# Patient Record
Sex: FEMALE | Race: WHITE | NOT HISPANIC OR LATINO | Employment: OTHER | URBAN - METROPOLITAN AREA
[De-identification: names, ages, dates, MRNs, and addresses within clinical notes are randomized per-mention and may not be internally consistent; named-entity substitution may affect disease eponyms.]

---

## 2017-01-04 ENCOUNTER — ALLSCRIPTS OFFICE VISIT (OUTPATIENT)
Dept: OTHER | Facility: OTHER | Age: 82
End: 2017-01-04

## 2017-01-04 DIAGNOSIS — G89.18 OTHER ACUTE POSTPROCEDURAL PAIN: ICD-10-CM

## 2017-01-04 DIAGNOSIS — R26.2 DIFFICULTY IN WALKING, NOT ELSEWHERE CLASSIFIED: ICD-10-CM

## 2017-01-04 DIAGNOSIS — R45.1 RESTLESSNESS AND AGITATION: ICD-10-CM

## 2017-01-04 DIAGNOSIS — E53.8 DEFICIENCY OF OTHER SPECIFIED B GROUP VITAMINS: ICD-10-CM

## 2017-01-04 DIAGNOSIS — R73.9 HYPERGLYCEMIA: ICD-10-CM

## 2017-01-04 DIAGNOSIS — E55.9 VITAMIN D DEFICIENCY: ICD-10-CM

## 2017-01-05 ENCOUNTER — TRANSCRIBE ORDERS (OUTPATIENT)
Dept: ADMINISTRATIVE | Age: 82
End: 2017-01-05

## 2017-01-05 ENCOUNTER — LAB (OUTPATIENT)
Dept: LAB | Age: 82
End: 2017-01-05
Payer: MEDICARE

## 2017-01-05 DIAGNOSIS — R45.1 RESTLESSNESS AND AGITATION: ICD-10-CM

## 2017-01-05 DIAGNOSIS — G89.18 OTHER ACUTE POSTPROCEDURAL PAIN: ICD-10-CM

## 2017-01-05 DIAGNOSIS — R26.2 DIFFICULTY IN WALKING, NOT ELSEWHERE CLASSIFIED: ICD-10-CM

## 2017-01-05 DIAGNOSIS — R73.9 HYPERGLYCEMIA: ICD-10-CM

## 2017-01-05 DIAGNOSIS — E53.8 DEFICIENCY OF OTHER SPECIFIED B GROUP VITAMINS: ICD-10-CM

## 2017-01-05 DIAGNOSIS — E55.9 VITAMIN D DEFICIENCY: ICD-10-CM

## 2017-01-05 LAB
25(OH)D3 SERPL-MCNC: 38.5 NG/ML (ref 30–100)
ALBUMIN SERPL BCP-MCNC: 3.6 G/DL (ref 3.5–5)
ALP SERPL-CCNC: 89 U/L (ref 46–116)
ALT SERPL W P-5'-P-CCNC: 22 U/L (ref 12–78)
ANION GAP SERPL CALCULATED.3IONS-SCNC: 5 MMOL/L (ref 4–13)
AST SERPL W P-5'-P-CCNC: 12 U/L (ref 5–45)
BASOPHILS # BLD AUTO: 0.02 THOUSANDS/ΜL (ref 0–0.1)
BASOPHILS NFR BLD AUTO: 0 % (ref 0–1)
BILIRUB SERPL-MCNC: 0.46 MG/DL (ref 0.2–1)
BUN SERPL-MCNC: 17 MG/DL (ref 5–25)
CALCIUM SERPL-MCNC: 9.6 MG/DL (ref 8.3–10.1)
CHLORIDE SERPL-SCNC: 105 MMOL/L (ref 100–108)
CHOLEST SERPL-MCNC: 238 MG/DL (ref 50–200)
CO2 SERPL-SCNC: 32 MMOL/L (ref 21–32)
CREAT SERPL-MCNC: 0.77 MG/DL (ref 0.6–1.3)
EOSINOPHIL # BLD AUTO: 0.19 THOUSAND/ΜL (ref 0–0.61)
EOSINOPHIL NFR BLD AUTO: 3 % (ref 0–6)
ERYTHROCYTE [DISTWIDTH] IN BLOOD BY AUTOMATED COUNT: 13.7 % (ref 11.6–15.1)
EST. AVERAGE GLUCOSE BLD GHB EST-MCNC: 117 MG/DL
GFR SERPL CREATININE-BSD FRML MDRD: >60 ML/MIN/1.73SQ M
GLUCOSE SERPL-MCNC: 83 MG/DL (ref 65–140)
HBA1C MFR BLD: 5.7 % (ref 4.2–6.3)
HCT VFR BLD AUTO: 43.2 % (ref 34.8–46.1)
HDLC SERPL-MCNC: 60 MG/DL (ref 40–60)
HGB BLD-MCNC: 14.1 G/DL (ref 11.5–15.4)
LDLC SERPL CALC-MCNC: 153 MG/DL (ref 0–100)
LYMPHOCYTES # BLD AUTO: 1.69 THOUSANDS/ΜL (ref 0.6–4.47)
LYMPHOCYTES NFR BLD AUTO: 24 % (ref 14–44)
MCH RBC QN AUTO: 28.4 PG (ref 26.8–34.3)
MCHC RBC AUTO-ENTMCNC: 32.6 G/DL (ref 31.4–37.4)
MCV RBC AUTO: 87 FL (ref 82–98)
MONOCYTES # BLD AUTO: 0.76 THOUSAND/ΜL (ref 0.17–1.22)
MONOCYTES NFR BLD AUTO: 11 % (ref 4–12)
NEUTROPHILS # BLD AUTO: 4.45 THOUSANDS/ΜL (ref 1.85–7.62)
NEUTS SEG NFR BLD AUTO: 62 % (ref 43–75)
NRBC BLD AUTO-RTO: 0 /100 WBCS
PLATELET # BLD AUTO: 217 THOUSANDS/UL (ref 149–390)
PMV BLD AUTO: 11.3 FL (ref 8.9–12.7)
POTASSIUM SERPL-SCNC: 4.6 MMOL/L (ref 3.5–5.3)
PROT SERPL-MCNC: 7.4 G/DL (ref 6.4–8.2)
RBC # BLD AUTO: 4.97 MILLION/UL (ref 3.81–5.12)
SODIUM SERPL-SCNC: 142 MMOL/L (ref 136–145)
TRIGL SERPL-MCNC: 126 MG/DL
TSH SERPL DL<=0.05 MIU/L-ACNC: 1.85 UIU/ML (ref 0.36–3.74)
VIT B12 SERPL-MCNC: 585 PG/ML (ref 100–900)
WBC # BLD AUTO: 7.14 THOUSAND/UL (ref 4.31–10.16)

## 2017-01-05 PROCEDURE — 85025 COMPLETE CBC W/AUTO DIFF WBC: CPT

## 2017-01-05 PROCEDURE — 36415 COLL VENOUS BLD VENIPUNCTURE: CPT

## 2017-01-05 PROCEDURE — 82607 VITAMIN B-12: CPT

## 2017-01-05 PROCEDURE — 83036 HEMOGLOBIN GLYCOSYLATED A1C: CPT

## 2017-01-05 PROCEDURE — 84443 ASSAY THYROID STIM HORMONE: CPT

## 2017-01-05 PROCEDURE — 80053 COMPREHEN METABOLIC PANEL: CPT

## 2017-01-05 PROCEDURE — 80061 LIPID PANEL: CPT

## 2017-01-05 PROCEDURE — 82306 VITAMIN D 25 HYDROXY: CPT

## 2017-01-30 ENCOUNTER — HOSPITAL ENCOUNTER (EMERGENCY)
Facility: HOSPITAL | Age: 82
Discharge: HOME | End: 2017-01-30
Attending: EMERGENCY MEDICINE | Admitting: EMERGENCY MEDICINE
Payer: MEDICARE

## 2017-01-30 ENCOUNTER — APPOINTMENT (EMERGENCY)
Dept: RADIOLOGY | Facility: HOSPITAL | Age: 82
End: 2017-01-30
Payer: MEDICARE

## 2017-01-30 VITALS
SYSTOLIC BLOOD PRESSURE: 161 MMHG | OXYGEN SATURATION: 94 % | TEMPERATURE: 97.8 F | WEIGHT: 110 LBS | HEART RATE: 75 BPM | DIASTOLIC BLOOD PRESSURE: 70 MMHG | RESPIRATION RATE: 18 BRPM

## 2017-01-30 DIAGNOSIS — R10.9 ABDOMINAL PAIN: ICD-10-CM

## 2017-01-30 DIAGNOSIS — K80.20 CHOLELITHIASES: Primary | ICD-10-CM

## 2017-01-30 LAB
ALBUMIN SERPL BCP-MCNC: 3.4 G/DL (ref 3.5–5)
ALP SERPL-CCNC: 106 U/L (ref 46–116)
ALT SERPL W P-5'-P-CCNC: 23 U/L (ref 12–78)
ANION GAP SERPL CALCULATED.3IONS-SCNC: 8 MMOL/L (ref 4–13)
AST SERPL W P-5'-P-CCNC: 18 U/L (ref 5–45)
BASOPHILS # BLD AUTO: 0.04 THOUSANDS/ΜL (ref 0–0.1)
BASOPHILS NFR BLD AUTO: 0 % (ref 0–1)
BILIRUB SERPL-MCNC: 0.33 MG/DL (ref 0.2–1)
BUN SERPL-MCNC: 17 MG/DL (ref 5–25)
CALCIUM SERPL-MCNC: 9.1 MG/DL (ref 8.3–10.1)
CHLORIDE SERPL-SCNC: 99 MMOL/L (ref 100–108)
CO2 SERPL-SCNC: 26 MMOL/L (ref 21–32)
CREAT SERPL-MCNC: 0.71 MG/DL (ref 0.6–1.3)
EOSINOPHIL # BLD AUTO: 0.13 THOUSAND/ΜL (ref 0–0.61)
EOSINOPHIL NFR BLD AUTO: 1 % (ref 0–6)
ERYTHROCYTE [DISTWIDTH] IN BLOOD BY AUTOMATED COUNT: 13.6 % (ref 11.6–15.1)
GFR SERPL CREATININE-BSD FRML MDRD: >60 ML/MIN/1.73SQ M
GLUCOSE SERPL-MCNC: 108 MG/DL (ref 65–140)
HCT VFR BLD AUTO: 42.2 % (ref 34.8–46.1)
HGB BLD-MCNC: 14.3 G/DL (ref 11.5–15.4)
LIPASE SERPL-CCNC: 209 U/L (ref 73–393)
LYMPHOCYTES # BLD AUTO: 1.94 THOUSANDS/ΜL (ref 0.6–4.47)
LYMPHOCYTES NFR BLD AUTO: 21 % (ref 14–44)
MCH RBC QN AUTO: 28.3 PG (ref 26.8–34.3)
MCHC RBC AUTO-ENTMCNC: 33.9 G/DL (ref 31.4–37.4)
MCV RBC AUTO: 83 FL (ref 82–98)
MONOCYTES # BLD AUTO: 1.16 THOUSAND/ΜL (ref 0.17–1.22)
MONOCYTES NFR BLD AUTO: 13 % (ref 4–12)
NEUTROPHILS # BLD AUTO: 5.91 THOUSANDS/ΜL (ref 1.85–7.62)
NEUTS SEG NFR BLD AUTO: 65 % (ref 43–75)
NRBC BLD AUTO-RTO: 0 /100 WBCS
PLATELET # BLD AUTO: 233 THOUSANDS/UL (ref 149–390)
PMV BLD AUTO: 10.7 FL (ref 8.9–12.7)
POTASSIUM SERPL-SCNC: 4.5 MMOL/L (ref 3.5–5.3)
PROT SERPL-MCNC: 7.4 G/DL (ref 6.4–8.2)
RBC # BLD AUTO: 5.06 MILLION/UL (ref 3.81–5.12)
SODIUM SERPL-SCNC: 133 MMOL/L (ref 136–145)
TROPONIN I SERPL-MCNC: <0.02 NG/ML
WBC # BLD AUTO: 9.22 THOUSAND/UL (ref 4.31–10.16)

## 2017-01-30 PROCEDURE — 36415 COLL VENOUS BLD VENIPUNCTURE: CPT

## 2017-01-30 PROCEDURE — 85025 COMPLETE CBC W/AUTO DIFF WBC: CPT

## 2017-01-30 PROCEDURE — 93005 ELECTROCARDIOGRAM TRACING: CPT

## 2017-01-30 PROCEDURE — 84484 ASSAY OF TROPONIN QUANT: CPT | Performed by: EMERGENCY MEDICINE

## 2017-01-30 PROCEDURE — 80053 COMPREHEN METABOLIC PANEL: CPT

## 2017-01-30 PROCEDURE — 76705 ECHO EXAM OF ABDOMEN: CPT

## 2017-01-30 PROCEDURE — 99285 EMERGENCY DEPT VISIT HI MDM: CPT

## 2017-01-30 PROCEDURE — 83690 ASSAY OF LIPASE: CPT

## 2017-01-30 RX ORDER — FLUPHENAZINE DECANOATE 25 MG/ML
INJECTION, SOLUTION INTRAMUSCULAR; SUBCUTANEOUS
COMMUNITY
End: 2017-12-02

## 2017-01-30 RX ORDER — OMEPRAZOLE 10 MG/1
10 CAPSULE, DELAYED RELEASE ORAL DAILY
COMMUNITY
End: 2017-01-30

## 2017-01-30 RX ORDER — LORAZEPAM 0.5 MG/1
0.5 TABLET ORAL EVERY 6 HOURS PRN
COMMUNITY

## 2017-01-30 RX ORDER — OMEPRAZOLE 20 MG/1
20 CAPSULE, DELAYED RELEASE ORAL DAILY
Qty: 30 CAPSULE | Refills: 0 | Status: SHIPPED | OUTPATIENT
Start: 2017-01-30 | End: 2018-09-27 | Stop reason: SDUPTHER

## 2017-01-30 RX ORDER — MAGNESIUM HYDROXIDE/ALUMINUM HYDROXICE/SIMETHICONE 120; 1200; 1200 MG/30ML; MG/30ML; MG/30ML
30 SUSPENSION ORAL ONCE
Status: COMPLETED | OUTPATIENT
Start: 2017-01-30 | End: 2017-01-30

## 2017-01-30 RX ORDER — FLUPHENAZINE HYDROCHLORIDE 5 MG/1
2.5 TABLET ORAL DAILY
COMMUNITY
End: 2018-05-10 | Stop reason: SDUPTHER

## 2017-01-30 RX ORDER — FUROSEMIDE 20 MG/1
20 TABLET ORAL 2 TIMES DAILY
COMMUNITY
End: 2017-12-02

## 2017-01-30 RX ORDER — LOXAPINE SUCCINATE 5 MG/1
5 TABLET ORAL 3 TIMES DAILY
COMMUNITY
End: 2017-12-02

## 2017-01-30 RX ORDER — SUCRALFATE ORAL 1 G/10ML
1 SUSPENSION ORAL
Qty: 420 ML | Refills: 0 | Status: SHIPPED | OUTPATIENT
Start: 2017-01-30 | End: 2017-12-02

## 2017-01-30 RX ORDER — SERTRALINE HYDROCHLORIDE 100 MG/1
25 TABLET, FILM COATED ORAL DAILY
COMMUNITY
End: 2017-12-02

## 2017-01-30 RX ADMIN — LIDOCAINE HYDROCHLORIDE 15 ML: 20 SOLUTION ORAL; TOPICAL at 21:03

## 2017-01-30 RX ADMIN — ALUMINUM HYDROXIDE, MAGNESIUM HYDROXIDE, AND SIMETHICONE 30 ML: 200; 200; 20 SUSPENSION ORAL at 21:03

## 2017-01-31 LAB
ATRIAL RATE: 73 BPM
P AXIS: 83 DEGREES
PR INTERVAL: 150 MS
QRS AXIS: -49 DEGREES
QRSD INTERVAL: 74 MS
QT INTERVAL: 370 MS
QTC INTERVAL: 407 MS
T WAVE AXIS: 68 DEGREES
VENTRICULAR RATE: 73 BPM

## 2017-02-10 ENCOUNTER — ALLSCRIPTS OFFICE VISIT (OUTPATIENT)
Dept: OTHER | Facility: OTHER | Age: 82
End: 2017-02-10

## 2017-02-10 ENCOUNTER — APPOINTMENT (OUTPATIENT)
Dept: LAB | Facility: HOSPITAL | Age: 82
End: 2017-02-10
Payer: MEDICARE

## 2017-02-10 DIAGNOSIS — N39.0 URINARY TRACT INFECTION: ICD-10-CM

## 2017-02-10 LAB
BACTERIA UR QL AUTO: ABNORMAL /HPF
BILIRUB UR QL STRIP: NEGATIVE
CLARITY UR: ABNORMAL
COLOR UR: YELLOW
GLUCOSE UR STRIP-MCNC: NEGATIVE MG/DL
HGB UR QL STRIP.AUTO: ABNORMAL
KETONES UR STRIP-MCNC: NEGATIVE MG/DL
LEUKOCYTE ESTERASE UR QL STRIP: ABNORMAL
NITRITE UR QL STRIP: POSITIVE
NON-SQ EPI CELLS URNS QL MICRO: ABNORMAL /HPF
PH UR STRIP.AUTO: 6 [PH] (ref 4.5–8)
PROT UR STRIP-MCNC: NEGATIVE MG/DL
RBC #/AREA URNS AUTO: ABNORMAL /HPF
SP GR UR STRIP.AUTO: 1.02 (ref 1–1.03)
UROBILINOGEN UR QL STRIP.AUTO: 1 E.U./DL
WBC #/AREA URNS AUTO: ABNORMAL /HPF

## 2017-02-10 PROCEDURE — 87077 CULTURE AEROBIC IDENTIFY: CPT

## 2017-02-10 PROCEDURE — 87186 SC STD MICRODIL/AGAR DIL: CPT

## 2017-02-10 PROCEDURE — 81001 URINALYSIS AUTO W/SCOPE: CPT

## 2017-02-10 PROCEDURE — 87086 URINE CULTURE/COLONY COUNT: CPT

## 2017-02-13 LAB — BACTERIA UR CULT: NORMAL

## 2017-04-04 DIAGNOSIS — N39.0 URINARY TRACT INFECTION: ICD-10-CM

## 2017-06-08 ENCOUNTER — ALLSCRIPTS OFFICE VISIT (OUTPATIENT)
Dept: OTHER | Facility: OTHER | Age: 82
End: 2017-06-08

## 2017-06-23 DIAGNOSIS — N39.0 URINARY TRACT INFECTION: ICD-10-CM

## 2017-06-26 ENCOUNTER — TRANSCRIBE ORDERS (OUTPATIENT)
Dept: ADMINISTRATIVE | Age: 82
End: 2017-06-26

## 2017-06-26 ENCOUNTER — APPOINTMENT (OUTPATIENT)
Dept: LAB | Age: 82
End: 2017-06-26
Payer: MEDICARE

## 2017-06-26 DIAGNOSIS — N39.0 URINARY TRACT INFECTION: ICD-10-CM

## 2017-06-26 LAB
BACTERIA UR QL AUTO: ABNORMAL /HPF
BILIRUB UR QL STRIP: NEGATIVE
CLARITY UR: ABNORMAL
COLOR UR: YELLOW
GLUCOSE UR STRIP-MCNC: NEGATIVE MG/DL
HGB UR QL STRIP.AUTO: NEGATIVE
HYALINE CASTS #/AREA URNS LPF: ABNORMAL /LPF
KETONES UR STRIP-MCNC: NEGATIVE MG/DL
LEUKOCYTE ESTERASE UR QL STRIP: ABNORMAL
NITRITE UR QL STRIP: POSITIVE
NON-SQ EPI CELLS URNS QL MICRO: ABNORMAL /HPF
PH UR STRIP.AUTO: 7.5 [PH] (ref 4.5–8)
PROT UR STRIP-MCNC: NEGATIVE MG/DL
RBC #/AREA URNS AUTO: ABNORMAL /HPF
SP GR UR STRIP.AUTO: 1.01 (ref 1–1.03)
UROBILINOGEN UR QL STRIP.AUTO: 0.2 E.U./DL
WBC #/AREA URNS AUTO: ABNORMAL /HPF

## 2017-06-26 PROCEDURE — 87086 URINE CULTURE/COLONY COUNT: CPT

## 2017-06-26 PROCEDURE — 87186 SC STD MICRODIL/AGAR DIL: CPT

## 2017-06-26 PROCEDURE — 81001 URINALYSIS AUTO W/SCOPE: CPT

## 2017-06-28 LAB
BACTERIA UR CULT: NORMAL
BACTERIA UR CULT: NORMAL

## 2017-07-06 ENCOUNTER — TRANSCRIBE ORDERS (OUTPATIENT)
Dept: ADMINISTRATIVE | Age: 82
End: 2017-07-06

## 2017-07-06 ENCOUNTER — APPOINTMENT (OUTPATIENT)
Dept: LAB | Age: 82
End: 2017-07-06
Payer: MEDICARE

## 2017-07-06 DIAGNOSIS — N39.0 URINARY TRACT INFECTION: ICD-10-CM

## 2017-07-06 PROCEDURE — 87077 CULTURE AEROBIC IDENTIFY: CPT

## 2017-07-06 PROCEDURE — 87086 URINE CULTURE/COLONY COUNT: CPT

## 2017-07-06 PROCEDURE — 87186 SC STD MICRODIL/AGAR DIL: CPT

## 2017-07-08 LAB — BACTERIA UR CULT: NORMAL

## 2017-08-09 ENCOUNTER — ALLSCRIPTS OFFICE VISIT (OUTPATIENT)
Dept: OTHER | Facility: OTHER | Age: 82
End: 2017-08-09

## 2017-08-09 DIAGNOSIS — N39.0 URINARY TRACT INFECTION: ICD-10-CM

## 2017-08-11 ENCOUNTER — TRANSCRIBE ORDERS (OUTPATIENT)
Dept: ADMINISTRATIVE | Age: 82
End: 2017-08-11

## 2017-08-11 ENCOUNTER — APPOINTMENT (OUTPATIENT)
Dept: LAB | Age: 82
End: 2017-08-11
Payer: MEDICARE

## 2017-08-11 DIAGNOSIS — N39.0 URINARY TRACT INFECTION: ICD-10-CM

## 2017-08-11 LAB
BACTERIA UR QL AUTO: ABNORMAL /HPF
BILIRUB UR QL STRIP: NEGATIVE
CLARITY UR: CLEAR
COLOR UR: YELLOW
GLUCOSE UR STRIP-MCNC: NEGATIVE MG/DL
HGB UR QL STRIP.AUTO: NEGATIVE
HYALINE CASTS #/AREA URNS LPF: ABNORMAL /LPF
KETONES UR STRIP-MCNC: NEGATIVE MG/DL
LEUKOCYTE ESTERASE UR QL STRIP: ABNORMAL
NITRITE UR QL STRIP: NEGATIVE
NON-SQ EPI CELLS URNS QL MICRO: ABNORMAL /HPF
PH UR STRIP.AUTO: 7.5 [PH] (ref 4.5–8)
PROT UR STRIP-MCNC: NEGATIVE MG/DL
RBC #/AREA URNS AUTO: ABNORMAL /HPF
SP GR UR STRIP.AUTO: 1.01 (ref 1–1.03)
UROBILINOGEN UR QL STRIP.AUTO: 0.2 E.U./DL
WBC #/AREA URNS AUTO: ABNORMAL /HPF

## 2017-08-11 PROCEDURE — 87086 URINE CULTURE/COLONY COUNT: CPT

## 2017-08-11 PROCEDURE — 87186 SC STD MICRODIL/AGAR DIL: CPT

## 2017-08-11 PROCEDURE — 87077 CULTURE AEROBIC IDENTIFY: CPT

## 2017-08-11 PROCEDURE — 81001 URINALYSIS AUTO W/SCOPE: CPT

## 2017-08-16 ENCOUNTER — TRANSCRIBE ORDERS (OUTPATIENT)
Dept: ADMINISTRATIVE | Age: 82
End: 2017-08-16

## 2017-08-18 LAB — BACTERIA UR CULT: NORMAL

## 2017-09-05 ENCOUNTER — APPOINTMENT (OUTPATIENT)
Dept: LAB | Age: 82
End: 2017-09-05
Payer: MEDICARE

## 2017-09-05 ENCOUNTER — TRANSCRIBE ORDERS (OUTPATIENT)
Dept: ADMINISTRATIVE | Age: 82
End: 2017-09-05

## 2017-09-05 DIAGNOSIS — N39.0 URINARY TRACT INFECTION: ICD-10-CM

## 2017-09-05 PROCEDURE — 87086 URINE CULTURE/COLONY COUNT: CPT

## 2017-09-05 PROCEDURE — 87186 SC STD MICRODIL/AGAR DIL: CPT

## 2017-09-05 PROCEDURE — 87077 CULTURE AEROBIC IDENTIFY: CPT

## 2017-09-07 LAB — BACTERIA UR CULT: NORMAL

## 2017-09-19 DIAGNOSIS — N39.0 URINARY TRACT INFECTION: ICD-10-CM

## 2017-09-20 ENCOUNTER — TRANSCRIBE ORDERS (OUTPATIENT)
Dept: ADMINISTRATIVE | Age: 82
End: 2017-09-20

## 2017-09-20 ENCOUNTER — APPOINTMENT (OUTPATIENT)
Dept: LAB | Age: 82
End: 2017-09-20
Payer: MEDICARE

## 2017-09-20 DIAGNOSIS — N39.0 URINARY TRACT INFECTION: ICD-10-CM

## 2017-09-20 PROCEDURE — 87186 SC STD MICRODIL/AGAR DIL: CPT

## 2017-09-20 PROCEDURE — 87077 CULTURE AEROBIC IDENTIFY: CPT

## 2017-09-20 PROCEDURE — 87086 URINE CULTURE/COLONY COUNT: CPT

## 2017-09-22 LAB — BACTERIA UR CULT: NORMAL

## 2017-11-03 ENCOUNTER — TRANSCRIBE ORDERS (OUTPATIENT)
Dept: ADMINISTRATIVE | Age: 82
End: 2017-11-03

## 2017-11-03 ENCOUNTER — LAB (OUTPATIENT)
Dept: LAB | Age: 82
End: 2017-11-03
Payer: MEDICARE

## 2017-11-03 DIAGNOSIS — E55.9 VITAMIN D DEFICIENCY DISEASE: ICD-10-CM

## 2017-11-03 DIAGNOSIS — R53.1 ASTHENIA: ICD-10-CM

## 2017-11-03 DIAGNOSIS — D50.8 OTHER IRON DEFICIENCY ANEMIA: Primary | ICD-10-CM

## 2017-11-03 DIAGNOSIS — D50.8 OTHER IRON DEFICIENCY ANEMIA: ICD-10-CM

## 2017-11-03 LAB
25(OH)D3 SERPL-MCNC: 45.1 NG/ML (ref 30–100)
ALBUMIN SERPL BCP-MCNC: 3.7 G/DL (ref 3.5–5)
ALP SERPL-CCNC: 88 U/L (ref 46–116)
ALT SERPL W P-5'-P-CCNC: 22 U/L (ref 12–78)
ANION GAP SERPL CALCULATED.3IONS-SCNC: 5 MMOL/L (ref 4–13)
AST SERPL W P-5'-P-CCNC: 19 U/L (ref 5–45)
BASOPHILS # BLD AUTO: 0.03 THOUSANDS/ΜL (ref 0–0.1)
BASOPHILS NFR BLD AUTO: 1 % (ref 0–1)
BILIRUB SERPL-MCNC: 0.67 MG/DL (ref 0.2–1)
BUN SERPL-MCNC: 15 MG/DL (ref 5–25)
CALCIUM SERPL-MCNC: 9.2 MG/DL (ref 8.3–10.1)
CHLORIDE SERPL-SCNC: 97 MMOL/L (ref 100–108)
CO2 SERPL-SCNC: 31 MMOL/L (ref 21–32)
CREAT SERPL-MCNC: 0.62 MG/DL (ref 0.6–1.3)
CRP SERPL QL: <3 MG/L
EOSINOPHIL # BLD AUTO: 0.07 THOUSAND/ΜL (ref 0–0.61)
EOSINOPHIL NFR BLD AUTO: 1 % (ref 0–6)
ERYTHROCYTE [DISTWIDTH] IN BLOOD BY AUTOMATED COUNT: 13.3 % (ref 11.6–15.1)
ERYTHROCYTE [SEDIMENTATION RATE] IN BLOOD: 16 MM/HOUR (ref 0–20)
EST. AVERAGE GLUCOSE BLD GHB EST-MCNC: 111 MG/DL
GFR SERPL CREATININE-BSD FRML MDRD: 81 ML/MIN/1.73SQ M
GLUCOSE P FAST SERPL-MCNC: 104 MG/DL (ref 65–99)
HBA1C MFR BLD: 5.5 % (ref 4.2–6.3)
HCT VFR BLD AUTO: 42.9 % (ref 34.8–46.1)
HGB BLD-MCNC: 14.2 G/DL (ref 11.5–15.4)
IRON SERPL-MCNC: 128 UG/DL (ref 50–170)
LYMPHOCYTES # BLD AUTO: 1.47 THOUSANDS/ΜL (ref 0.6–4.47)
LYMPHOCYTES NFR BLD AUTO: 22 % (ref 14–44)
MAGNESIUM SERPL-MCNC: 2.4 MG/DL (ref 1.6–2.6)
MCH RBC QN AUTO: 28.1 PG (ref 26.8–34.3)
MCHC RBC AUTO-ENTMCNC: 33.1 G/DL (ref 31.4–37.4)
MCV RBC AUTO: 85 FL (ref 82–98)
MONOCYTES # BLD AUTO: 0.6 THOUSAND/ΜL (ref 0.17–1.22)
MONOCYTES NFR BLD AUTO: 9 % (ref 4–12)
NEUTROPHILS # BLD AUTO: 4.45 THOUSANDS/ΜL (ref 1.85–7.62)
NEUTS SEG NFR BLD AUTO: 67 % (ref 43–75)
NRBC BLD AUTO-RTO: 0 /100 WBCS
PHOSPHATE SERPL-MCNC: 3.3 MG/DL (ref 2.3–4.1)
PLATELET # BLD AUTO: 198 THOUSANDS/UL (ref 149–390)
PMV BLD AUTO: 11 FL (ref 8.9–12.7)
POTASSIUM SERPL-SCNC: 4.2 MMOL/L (ref 3.5–5.3)
PROT SERPL-MCNC: 7.4 G/DL (ref 6.4–8.2)
RBC # BLD AUTO: 5.05 MILLION/UL (ref 3.81–5.12)
SODIUM SERPL-SCNC: 133 MMOL/L (ref 136–145)
TSH SERPL DL<=0.05 MIU/L-ACNC: 1.69 UIU/ML (ref 0.36–3.74)
VIT B12 SERPL-MCNC: 496 PG/ML (ref 100–900)
WBC # BLD AUTO: 6.64 THOUSAND/UL (ref 4.31–10.16)

## 2017-11-03 PROCEDURE — 86140 C-REACTIVE PROTEIN: CPT

## 2017-11-03 PROCEDURE — 84100 ASSAY OF PHOSPHORUS: CPT

## 2017-11-03 PROCEDURE — 85652 RBC SED RATE AUTOMATED: CPT

## 2017-11-03 PROCEDURE — 82607 VITAMIN B-12: CPT

## 2017-11-03 PROCEDURE — 82306 VITAMIN D 25 HYDROXY: CPT

## 2017-11-03 PROCEDURE — 83735 ASSAY OF MAGNESIUM: CPT

## 2017-11-03 PROCEDURE — 80053 COMPREHEN METABOLIC PANEL: CPT

## 2017-11-03 PROCEDURE — 84443 ASSAY THYROID STIM HORMONE: CPT

## 2017-11-03 PROCEDURE — 83036 HEMOGLOBIN GLYCOSYLATED A1C: CPT

## 2017-11-03 PROCEDURE — 36415 COLL VENOUS BLD VENIPUNCTURE: CPT

## 2017-11-03 PROCEDURE — 85025 COMPLETE CBC W/AUTO DIFF WBC: CPT

## 2017-11-03 PROCEDURE — 83540 ASSAY OF IRON: CPT

## 2017-11-07 ENCOUNTER — GENERIC CONVERSION - ENCOUNTER (OUTPATIENT)
Dept: OTHER | Facility: OTHER | Age: 82
End: 2017-11-07

## 2017-11-08 DIAGNOSIS — R30.0 DYSURIA: ICD-10-CM

## 2017-11-08 DIAGNOSIS — E87.1 HYPO-OSMOLALITY AND HYPONATREMIA (CODE): ICD-10-CM

## 2017-11-08 DIAGNOSIS — M62.81 MUSCLE WEAKNESS (GENERALIZED): ICD-10-CM

## 2017-11-08 DIAGNOSIS — R26.2 DIFFICULTY IN WALKING, NOT ELSEWHERE CLASSIFIED: ICD-10-CM

## 2017-11-29 ENCOUNTER — TRANSCRIBE ORDERS (OUTPATIENT)
Dept: ADMINISTRATIVE | Age: 82
End: 2017-11-29

## 2017-11-29 ENCOUNTER — LAB (OUTPATIENT)
Dept: LAB | Age: 82
End: 2017-11-29
Payer: MEDICARE

## 2017-11-29 DIAGNOSIS — E87.1 HYPO-OSMOLALITY AND HYPONATREMIA (CODE): ICD-10-CM

## 2017-11-29 DIAGNOSIS — M62.81 MUSCLE WEAKNESS (GENERALIZED): ICD-10-CM

## 2017-11-29 DIAGNOSIS — R26.2 DIFFICULTY IN WALKING, NOT ELSEWHERE CLASSIFIED: ICD-10-CM

## 2017-11-29 DIAGNOSIS — E87.1 HYPOSMOLALITY SYNDROME: Primary | ICD-10-CM

## 2017-11-29 LAB
ALBUMIN SERPL BCP-MCNC: 3.4 G/DL (ref 3.5–5)
ALP SERPL-CCNC: 98 U/L (ref 46–116)
ALT SERPL W P-5'-P-CCNC: 23 U/L (ref 12–78)
ANION GAP SERPL CALCULATED.3IONS-SCNC: 6 MMOL/L (ref 4–13)
AST SERPL W P-5'-P-CCNC: 16 U/L (ref 5–45)
BASOPHILS # BLD AUTO: 0.02 THOUSANDS/ΜL (ref 0–0.1)
BASOPHILS NFR BLD AUTO: 0 % (ref 0–1)
BILIRUB SERPL-MCNC: 0.36 MG/DL (ref 0.2–1)
BUN SERPL-MCNC: 14 MG/DL (ref 5–25)
CALCIUM SERPL-MCNC: 9.3 MG/DL (ref 8.3–10.1)
CHLORIDE SERPL-SCNC: 97 MMOL/L (ref 100–108)
CK SERPL-CCNC: 30 U/L (ref 26–192)
CO2 SERPL-SCNC: 29 MMOL/L (ref 21–32)
CREAT SERPL-MCNC: 0.87 MG/DL (ref 0.6–1.3)
CRP SERPL QL: <3 MG/L
EOSINOPHIL # BLD AUTO: 0.06 THOUSAND/ΜL (ref 0–0.61)
EOSINOPHIL NFR BLD AUTO: 1 % (ref 0–6)
ERYTHROCYTE [DISTWIDTH] IN BLOOD BY AUTOMATED COUNT: 13.2 % (ref 11.6–15.1)
GFR SERPL CREATININE-BSD FRML MDRD: 60 ML/MIN/1.73SQ M
GLUCOSE SERPL-MCNC: 124 MG/DL (ref 65–140)
HCT VFR BLD AUTO: 41.8 % (ref 34.8–46.1)
HGB BLD-MCNC: 14.3 G/DL (ref 11.5–15.4)
LYMPHOCYTES # BLD AUTO: 1.92 THOUSANDS/ΜL (ref 0.6–4.47)
LYMPHOCYTES NFR BLD AUTO: 22 % (ref 14–44)
MAGNESIUM SERPL-MCNC: 2.4 MG/DL (ref 1.6–2.6)
MCH RBC QN AUTO: 28.4 PG (ref 26.8–34.3)
MCHC RBC AUTO-ENTMCNC: 34.2 G/DL (ref 31.4–37.4)
MCV RBC AUTO: 83 FL (ref 82–98)
MONOCYTES # BLD AUTO: 0.87 THOUSAND/ΜL (ref 0.17–1.22)
MONOCYTES NFR BLD AUTO: 10 % (ref 4–12)
NEUTROPHILS # BLD AUTO: 5.87 THOUSANDS/ΜL (ref 1.85–7.62)
NEUTS SEG NFR BLD AUTO: 67 % (ref 43–75)
NRBC BLD AUTO-RTO: 0 /100 WBCS
OSMOLALITY UR/SERPL-RTO: 285 MMOL/KG (ref 282–298)
PHOSPHATE SERPL-MCNC: 4.5 MG/DL (ref 2.3–4.1)
PLATELET # BLD AUTO: 244 THOUSANDS/UL (ref 149–390)
PMV BLD AUTO: 11.2 FL (ref 8.9–12.7)
POTASSIUM SERPL-SCNC: 3.9 MMOL/L (ref 3.5–5.3)
PREALB SERPL-MCNC: 21.9 MG/DL (ref 18–40)
PROT SERPL-MCNC: 7.2 G/DL (ref 6.4–8.2)
RBC # BLD AUTO: 5.03 MILLION/UL (ref 3.81–5.12)
SODIUM SERPL-SCNC: 132 MMOL/L (ref 136–145)
WBC # BLD AUTO: 8.78 THOUSAND/UL (ref 4.31–10.16)

## 2017-11-29 PROCEDURE — 83930 ASSAY OF BLOOD OSMOLALITY: CPT

## 2017-11-29 PROCEDURE — 85025 COMPLETE CBC W/AUTO DIFF WBC: CPT

## 2017-11-29 PROCEDURE — 84134 ASSAY OF PREALBUMIN: CPT

## 2017-11-29 PROCEDURE — 80053 COMPREHEN METABOLIC PANEL: CPT

## 2017-11-29 PROCEDURE — 86140 C-REACTIVE PROTEIN: CPT

## 2017-11-29 PROCEDURE — 84100 ASSAY OF PHOSPHORUS: CPT

## 2017-11-29 PROCEDURE — 83735 ASSAY OF MAGNESIUM: CPT

## 2017-11-29 PROCEDURE — 36415 COLL VENOUS BLD VENIPUNCTURE: CPT

## 2017-11-29 PROCEDURE — 82550 ASSAY OF CK (CPK): CPT

## 2017-11-30 ENCOUNTER — APPOINTMENT (OUTPATIENT)
Dept: LAB | Age: 82
End: 2017-11-30
Payer: MEDICARE

## 2017-11-30 LAB
BACTERIA UR QL AUTO: ABNORMAL /HPF
BILIRUB UR QL STRIP: NEGATIVE
CLARITY UR: ABNORMAL
COLOR UR: YELLOW
GLUCOSE UR STRIP-MCNC: NEGATIVE MG/DL
HGB UR QL STRIP.AUTO: NEGATIVE
HYALINE CASTS #/AREA URNS LPF: ABNORMAL /LPF
KETONES UR STRIP-MCNC: NEGATIVE MG/DL
LEUKOCYTE ESTERASE UR QL STRIP: ABNORMAL
NITRITE UR QL STRIP: NEGATIVE
NON-SQ EPI CELLS URNS QL MICRO: ABNORMAL /HPF
OSMOLALITY UR: 385 MMOL/KG
PH UR STRIP.AUTO: 7 [PH] (ref 4.5–8)
PROT UR STRIP-MCNC: NEGATIVE MG/DL
RBC #/AREA URNS AUTO: ABNORMAL /HPF
SODIUM 24H UR-SCNC: 32 MOL/L
SP GR UR STRIP.AUTO: 1.01 (ref 1–1.03)
UROBILINOGEN UR QL STRIP.AUTO: 0.2 E.U./DL
WBC #/AREA URNS AUTO: ABNORMAL /HPF

## 2017-11-30 PROCEDURE — 83935 ASSAY OF URINE OSMOLALITY: CPT | Performed by: FAMILY MEDICINE

## 2017-11-30 PROCEDURE — 81001 URINALYSIS AUTO W/SCOPE: CPT | Performed by: FAMILY MEDICINE

## 2017-11-30 PROCEDURE — 84300 ASSAY OF URINE SODIUM: CPT | Performed by: FAMILY MEDICINE

## 2017-12-02 ENCOUNTER — HOSPITAL ENCOUNTER (EMERGENCY)
Facility: HOSPITAL | Age: 82
Discharge: HOME | End: 2017-12-02
Attending: EMERGENCY MEDICINE | Admitting: EMERGENCY MEDICINE
Payer: MEDICARE

## 2017-12-02 VITALS
SYSTOLIC BLOOD PRESSURE: 163 MMHG | TEMPERATURE: 98.8 F | WEIGHT: 117 LBS | RESPIRATION RATE: 22 BRPM | OXYGEN SATURATION: 94 % | HEART RATE: 60 BPM | DIASTOLIC BLOOD PRESSURE: 71 MMHG

## 2017-12-02 DIAGNOSIS — R53.1 GENERALIZED WEAKNESS: Primary | ICD-10-CM

## 2017-12-02 LAB
ANION GAP SERPL CALCULATED.3IONS-SCNC: 3 MMOL/L (ref 4–13)
BASOPHILS # BLD AUTO: 0.01 THOUSANDS/ΜL (ref 0–0.1)
BASOPHILS NFR BLD AUTO: 0 % (ref 0–1)
BUN SERPL-MCNC: 14 MG/DL (ref 5–25)
CALCIUM SERPL-MCNC: 9.4 MG/DL (ref 8.3–10.1)
CHLORIDE SERPL-SCNC: 96 MMOL/L (ref 100–108)
CO2 SERPL-SCNC: 33 MMOL/L (ref 21–32)
CREAT SERPL-MCNC: 0.62 MG/DL (ref 0.6–1.3)
EOSINOPHIL # BLD AUTO: 0.03 THOUSAND/ΜL (ref 0–0.61)
EOSINOPHIL NFR BLD AUTO: 0 % (ref 0–6)
ERYTHROCYTE [DISTWIDTH] IN BLOOD BY AUTOMATED COUNT: 13.2 % (ref 11.6–15.1)
GFR SERPL CREATININE-BSD FRML MDRD: 81 ML/MIN/1.73SQ M
GLUCOSE SERPL-MCNC: 113 MG/DL (ref 65–140)
HCT VFR BLD AUTO: 40.4 % (ref 34.8–46.1)
HGB BLD-MCNC: 14.1 G/DL (ref 11.5–15.4)
LYMPHOCYTES # BLD AUTO: 1.27 THOUSANDS/ΜL (ref 0.6–4.47)
LYMPHOCYTES NFR BLD AUTO: 18 % (ref 14–44)
MCH RBC QN AUTO: 29.1 PG (ref 26.8–34.3)
MCHC RBC AUTO-ENTMCNC: 34.9 G/DL (ref 31.4–37.4)
MCV RBC AUTO: 83 FL (ref 82–98)
MONOCYTES # BLD AUTO: 0.83 THOUSAND/ΜL (ref 0.17–1.22)
MONOCYTES NFR BLD AUTO: 12 % (ref 4–12)
NEUTROPHILS # BLD AUTO: 4.72 THOUSANDS/ΜL (ref 1.85–7.62)
NEUTS SEG NFR BLD AUTO: 70 % (ref 43–75)
NRBC BLD AUTO-RTO: 0 /100 WBCS
PLATELET # BLD AUTO: 212 THOUSANDS/UL (ref 149–390)
PMV BLD AUTO: 9.9 FL (ref 8.9–12.7)
POTASSIUM SERPL-SCNC: 4.3 MMOL/L (ref 3.5–5.3)
RBC # BLD AUTO: 4.85 MILLION/UL (ref 3.81–5.12)
SODIUM SERPL-SCNC: 132 MMOL/L (ref 136–145)
TSH SERPL DL<=0.05 MIU/L-ACNC: 1.46 UIU/ML (ref 0.36–3.74)
WBC # BLD AUTO: 6.9 THOUSAND/UL (ref 4.31–10.16)

## 2017-12-02 PROCEDURE — 85025 COMPLETE CBC W/AUTO DIFF WBC: CPT | Performed by: EMERGENCY MEDICINE

## 2017-12-02 PROCEDURE — 80048 BASIC METABOLIC PNL TOTAL CA: CPT | Performed by: EMERGENCY MEDICINE

## 2017-12-02 PROCEDURE — 99285 EMERGENCY DEPT VISIT HI MDM: CPT

## 2017-12-02 PROCEDURE — 84443 ASSAY THYROID STIM HORMONE: CPT | Performed by: EMERGENCY MEDICINE

## 2017-12-02 PROCEDURE — 93005 ELECTROCARDIOGRAM TRACING: CPT

## 2017-12-02 PROCEDURE — 36415 COLL VENOUS BLD VENIPUNCTURE: CPT | Performed by: EMERGENCY MEDICINE

## 2017-12-02 PROCEDURE — 96360 HYDRATION IV INFUSION INIT: CPT

## 2017-12-02 RX ADMIN — SODIUM CHLORIDE 1000 ML: 0.9 INJECTION, SOLUTION INTRAVENOUS at 12:13

## 2017-12-02 NOTE — ED PROVIDER NOTES
History  Chief Complaint   Patient presents with    Weakness - Generalized     Daughter states pt has been weak for the past week  Shaky  Pt denies pain  79 yo [place] F brought to ED today by daughter for progressive weakness  Pt has PMH of depression, anxiety, and hypertension for which she is on fluphenazine, ativan, metoprolol, lasix, ASA  Pt does not speak [place]  History provided by daughter, who is a pharmacist  Pt previously was able to ambulate on her own around the house, but now requires significant assistance just to stand  Pt's daughter reports pt's generalized weakness started about 1 month ago, gradual slow decline  She has had comprehensive labwork done including CBC, CMP, TSH, mag, phos, iron, ESR, CRP, B12, vit D, HgbA1c  All unremarkable except for mild hyponatremia with sodium 133  Pt has worked with physical therapy with minimal improvement  Over the last week, pt's daughter has noticed further, more quickly decline  Pt remains oriented  Just complains of fatigue but otherwise has no focal complaints  Minimal po intake, which is baseline for pt  No nausea/vomiting, abdominal pain, chest pain, shortness of breath  Last BM was yesterday and was normal per daughter except for some BRBPR on toilet paper with wiping, which is normal for pt as she has known hemorrhoids  Pt is incontinent of urine at baseline and has had recurrent UTIs previously, currently asymptomatic  New med includes low dose zoloft that was started beginning of November for depression  Daughter denies that pt has had any falls but pt has had many "near falls "            Prior to Admission Medications   Prescriptions Last Dose Informant Patient Reported? Taking?    LORazepam (ATIVAN) 0 5 mg tablet   Yes Yes   Sig: Take 0 5 mg by mouth every 6 (six) hours as needed for anxiety   aspirin 81 MG tablet   Yes No   Sig: Take 325 mg by mouth daily     fluPHENAZine (PROLIXIN) 5 mg tablet   Yes Yes   Sig: Take 2 5 mg by mouth daily   metoprolol tartrate (LOPRESSOR) 25 mg tablet   Yes Yes   Sig: Take 25 mg by mouth 2 (two) times a day   omeprazole (PriLOSEC) 20 mg delayed release capsule   No Yes   Sig: Take 1 capsule by mouth daily for 30 days      Facility-Administered Medications: None       Past Medical History:   Diagnosis Date    Anxiety     Depression     Hypertension     Stomach ache        Past Surgical History:   Procedure Laterality Date    FEMUR SURGERY Right     TOTAL HIP ARTHROPLASTY Left 2007       History reviewed  No pertinent family history  I have reviewed and agree with the history as documented  Social History   Substance Use Topics    Smoking status: Never Smoker    Smokeless tobacco: Never Used    Alcohol use No        Review of Systems   Constitutional: Positive for activity change and fatigue  Negative for fever  HENT: Negative for congestion, rhinorrhea and sore throat  Eyes: Negative for photophobia, pain, discharge and visual disturbance  Respiratory: Negative for cough, chest tightness, shortness of breath and wheezing  Cardiovascular: Negative for chest pain, palpitations and leg swelling  Gastrointestinal: Negative for abdominal pain, nausea and vomiting  Genitourinary: Negative for difficulty urinating, dysuria, pelvic pain and urgency  Musculoskeletal: Negative for back pain, neck pain and neck stiffness  Skin: Negative for color change, rash and wound  Neurological: Positive for weakness  Negative for dizziness, seizures, syncope, facial asymmetry, numbness and headaches  Psychiatric/Behavioral: Positive for agitation  Negative for confusion         Physical Exam  ED Triage Vitals [12/02/17 0920]   Temperature Pulse Respirations Blood Pressure SpO2   98 8 °F (37 1 °C) 80 16 157/74 97 %      Temp Source Heart Rate Source Patient Position - Orthostatic VS BP Location FiO2 (%)   Oral Monitor Sitting Left arm --      Pain Score       No Pain           Orthostatic Vital Signs  Vitals:    12/02/17 0920 12/02/17 1015 12/02/17 1145 12/02/17 1215   BP: 157/74 164/73 (!) 184/86 163/71   Pulse: 80 68 58 60   Patient Position - Orthostatic VS: Sitting          Physical Exam   Constitutional: She is oriented to person, place, and time  Appears uncomfortable, no acute distress   HENT:   Head: Normocephalic and atraumatic  Eyes: Conjunctivae and EOM are normal  Pupils are equal, round, and reactive to light  No scleral icterus  Neck: Normal range of motion  Neck supple  Cardiovascular: Normal rate, regular rhythm and intact distal pulses  Pulmonary/Chest: Breath sounds normal  No respiratory distress  She has no wheezes  Mildly increased work of breathing, which pt's daughter says it at her baseline   Abdominal: Soft  Bowel sounds are normal  There is no tenderness  There is no rebound and no guarding  Musculoskeletal: She exhibits no edema or tenderness  Neurological: She is alert and oriented to person, place, and time  No sensory deficit  She exhibits normal muscle tone  Skin: Skin is warm and dry  Nursing note and vitals reviewed        ED Medications  Medications   sodium chloride 0 9 % bolus 1,000 mL (0 mL Intravenous Stopped 12/2/17 1316)       Diagnostic Studies  Results Reviewed     Procedure Component Value Units Date/Time    Basic metabolic panel [[id number]]  (Abnormal) Collected:  12/02/17 1045    Lab Status:  Final result Specimen:  Blood from Arm, Left Updated:  12/02/17 1127     Sodium 132 (L) mmol/L      Potassium 4 3 mmol/L      Chloride 96 (L) mmol/L      CO2 33 (H) mmol/L      Anion Gap 3 (L) mmol/L      BUN 14 mg/dL      Creatinine 0 62 mg/dL      Glucose 113 mg/dL      Calcium 9 4 mg/dL      eGFR 81 ml/min/1 73sq m     Narrative:         National Kidney Disease Education Program recommendations are as follows:  GFR calculation is accurate only with a steady state creatinine  Chronic Kidney disease less than 60 ml/min/1 73 sq  meters  Kidney failure less than 15 ml/min/1 73 sq  meters  TSH, 3rd generation with T4 reflex [[id number]]  (Normal) Collected:  12/02/17 1045    Lab Status:  Final result Specimen:  Blood from Arm, Left Updated:  12/02/17 1127     TSH 3RD GENERATON 1 460 uIU/mL     Narrative:         Patients undergoing fluorescein dye angiography may retain small amounts of fluorescein in the body for 48-72 hours post procedure  Samples containing fluorescein can produce falsely depressed TSH values  If the patient had this procedure,a specimen should be resubmitted post fluorescein clearance            The recommended reference ranges for TSH during pregnancy are as follows:  First trimester 0 1 to 2 5 uIU/mL  Second trimester  0 2 to 3 0 uIU/mL  Third trimester 0 3 to 3 0 uIU/m      CBC and differential [[id number]]  (Normal) Collected:  12/02/17 1045    Lab Status:  Final result Specimen:  Blood from Arm, Left Updated:  12/02/17 1112     WBC 6 90 Thousand/uL      RBC 4 85 Million/uL      Hemoglobin 14 1 g/dL      Hematocrit 40 4 %      MCV 83 fL      MCH 29 1 pg      MCHC 34 9 g/dL      RDW 13 2 %      MPV 9 9 fL      Platelets 348 Thousands/uL      nRBC 0 /100 WBCs      Neutrophils Relative 70 %      Lymphocytes Relative 18 %      Monocytes Relative 12 %      Eosinophils Relative 0 %      Basophils Relative 0 %      Neutrophils Absolute 4 72 Thousands/µL      Lymphocytes Absolute 1 27 Thousands/µL      Monocytes Absolute 0 83 Thousand/µL      Eosinophils Absolute 0 03 Thousand/µL      Basophils Absolute 0 01 Thousands/µL                  No orders to display         Procedures  ECG 12 Lead Documentation  Date/Time: 12/2/2017 10:36 AM  Performed by: Murray Galan  Authorized by: Hawk Bullock     Indications / Diagnosis:  Generalized weakness  ECG reviewed by me, the ED Provider: yes    Patient location:  ED  Previous ECG:     Previous ECG:  Compared to current  Rate:     ECG rate:  75    ECG rate assessment: normal    Rhythm:     Rhythm: sinus rhythm    ST segments:     ST segments:  Non-specific  T waves:     T waves: normal            Phone Consults  ED Phone Contact    ED Course  ED Course            Identification of Seniors at [facility] Most Recent Value   (ISAR) Identification of Seniors at Risk   Before the illness or injury that brought you to the Emergency, did you need someone to help you on a regular basis? 1 Filed at: 12/02/2017 1667   In the last 24 hours, have you needed more help than usual?  1 Filed at: 12/02/2017 3483   Have you been hospitalized for one or more nights during the past 6 months? 0 Filed at: 12/02/2017 7446   In general, do you see well? 1 Filed at: 12/02/2017 4206   In general, do you have serious problems with your memory? 0 Filed at: 12/02/2017 6249   Do you take more than three different medications every day? 1 Filed at: 12/02/2017 8437   ISAR Score  4 Filed at: 12/02/2017 8916                          MDM  Number of Diagnoses or Management Options  Generalized weakness:   Diagnosis management comments: 79 yo F with progressive generalized x1 month, worse in the last weak  Exam nonfocal  Vital signs stable  EKG unremarkable  Labs today stable, with mild hyponatremia at 132  TSH normal  Given 1 L NS  Pt's daughter is pharmacist and is from [place] but currently able to provide 24 hr supervision  Discussed admission vs discharge home with close observation  Pt and daughter elect to go home  Return precautions discussed  CritCare Time    Disposition  Final diagnoses:   Generalized weakness     Time reflects when diagnosis was documented in both MDM as applicable and the Disposition within this note     Time User Action Codes Description Comment    12/2/2017  1:36 PM Rui Mcgrath Add [R53 1] Generalized weakness       ED Disposition     ED Disposition Condition Comment    Discharge  Enrique Oneal discharge to home/self care      Condition at discharge: Stable        Follow-up Information     Follow up With Specialties Details Why Contact Info Additional [address] Emergency Department Emergency Medicine  If symptoms worsen, As needed [address]  [phone]  ED, [address] [place] [address], Alfredo Cheek MD Family Medicine  As needed [address] [address] [address]  [phone]           Discharge Medication List as of 12/2/2017  1:38 PM      CONTINUE these medications which have NOT CHANGED    Details   fluPHENAZine (PROLIXIN) 5 mg tablet Take 2 5 mg by mouth daily, Until Discontinued, Historical Med      LORazepam (ATIVAN) 0 5 mg tablet Take 0 5 mg by mouth every 6 (six) hours as needed for anxiety, Until Discontinued, Historical Med      metoprolol tartrate (LOPRESSOR) 25 mg tablet Take 25 mg by mouth 2 (two) times a day, Until Discontinued, Historical Med      omeprazole (PriLOSEC) 20 mg delayed release capsule Take 1 capsule by mouth daily for 30 days, Starting Mon 1/30/2017, Until Sat 12/2/2017, Print      aspirin 81 MG tablet Take 325 mg by mouth daily  , Historical Med           No discharge procedures on file  ED Provider  Attending physically available and evaluated Maxine Thornton Kimmy VALLADARES managed the patient along with the ED Attending      Electronically Signed by         Paola Quezada MD  Resident  12/02/17 3370

## 2017-12-02 NOTE — ED NOTES
Family does not want blood work done because it was done 3 days ago at [facility]  They would like to speak with doctor first to see if it is necessary        Crystal Servin RN  85/59/45 8934

## 2017-12-02 NOTE — ED NOTES
Family voiced concerned that the "blood pressure cuff is not working properly, giving right readings or is acting right"  Repositioned the pt with a smaller cuff        Mak Mcgowan RN  12/02/17 9128

## 2017-12-02 NOTE — ED ATTENDING ATTESTATION
Rajwinder Arauz MD, saw and evaluated the patient  I have discussed the patient with the resident/non-physician practitioner and agree with the resident's/non-physician practitioner's findings, Plan of Care, and MDM as documented in the resident's/non-physician practitioner's note, except where noted  All available labs and Radiology studies were reviewed  At this point I agree with the current assessment done in the Emergency Department  I have conducted an independent evaluation of this patient a history and physical is as follows:Progressive weakness for a month Extensive testing has been unremarkable  Now even weaker  Trouble with ADLs  Now needs assistance to stand  In view of declining status and inability to do ADLs we offered admission  The daughter would prefer repeat testing and IV hydration    Patient has been quite depressed for the last month and I wonder if this does not place a large role in her disability      Critical Care Time  CritCare Time

## 2017-12-02 NOTE — ED NOTES
Pt stated that she needed to go to the bathroom and then did not want to get up  Placed the pt on the bedpan and cleansed the pt  Daughter, at the bedside, concerned that the pt "is not clean enough and the urine is going to go through the poop  And now she will have ecoli in the urine " Urine sample thrown out at this time        Cammie Curling, RN  12/02/17 4552

## 2017-12-04 LAB
ATRIAL RATE: 39 BPM
QRS AXIS: 26 DEGREES
QRSD INTERVAL: 70 MS
QT INTERVAL: 362 MS
QTC INTERVAL: 404 MS
T WAVE AXIS: 70 DEGREES
VENTRICULAR RATE: 75 BPM

## 2018-01-10 NOTE — PROCEDURES
Procedures signed  by Matt Henao DO at 5/19/2016  8:30 AM       Author:  Matt Henao DO Service:  (none) Author Type:  Physician     Filed:  5/19/2016  8:30 AM Date of Service:  5/18/2016  5:59 PM Status:  Signed     :  Matt Henao DO (Physician)            Julianne   [id number]  NEUROLOGY REPORT  05/18/2016    ELECTRODIAGNOSTIC STUDY     REFERRING PHYSICIAN   Kedar Sher MD     HISTORY OF PRESENT ILLNESS  The patient is an 25-year-old, right-hand dominant female brought in   accompanied by her daughter who acts as an ad hoc  for   [facility] to [place]  She had a previous study of the right upper   extremity at an outside hospital  Past results are unknown, but she   did have an ulnar release by Dr Enriqueta Guillory some years ago  Currently,   they describe a declining in her hand function such as weakness and   inability to do functional tasks  She does have numbness in both   hands  Denies any pain in the hands  She has been tentatively told   that she may have cervical stenosis based upon imaging  She presents   today for an electrodiagnostic study of both upper extremities  Comparison made for RUE to Jack Dumont MD study of 9/20111, and OS report for left of 3/11  PAST MEDICAL HISTORY   Atrial fibrillation, on aspirin alone therapy  PHYSICAL EXAMINATION  Marked first dorsal interosseous atrophy in both hands and diminished   sensation to pin in both ulnar territories  Median and radial   territories were intact  There are no signs of any long tract   involvement  Reflexes are symmetric and normal       ELECTRODIAGNOSTIC FINDINGS     ELECTRONEUROGRAPHY  Nerve conduction studies were entirely normal for the right median   nerve including motor and sensory fibers and the left median nerve   including motor and sensory fibers  The bilateral sensory nerve action potentials were reduced in   amplitude consistent with sensory fiber dropout        The bilateral ulnar motor fibers demonstrated normal distal latencies   with a normal amplitude response on the left but reduced amplitude   response on the right  Conduction was normal in both forearms; however   on the left it dropped to 46 m/sec through the across elbow segment,   which is significant; on the right dropped to 31 m/sec across the   elbow which was significant  There was also marked temporal dispersion   in the waveforms proximally indicating variable conduction due to   demyelination of the fibers  ELECTROMYOGRAPHY  Monopolar needle exploration failed to reveal any abnormal spontaneous   potentials in the following muscles: Bilateral deltoid, bilateral   biceps, bilateral triceps, bilateral brachioradialis and bilateral   first dorsal interosseous  Motor units in all muscles were normal   except for both APBs where there was a marked amount of polyphasic   potentials noted and reduced motor unit recruitment in both these   muscles  Exploration of the cervical paraspinals was deferred as the patient   had marked vertigo with any positional changes on the exam table  IMPRESSION  1  The above electrophysiologic data document 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 which is severe, more so on the right   than the left and chronic in nature  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  Findings for the right appear about the same as 9/2011, the left appears slightly improved  2  Median nerve function is entirely normal bilaterally  3  There is no electrical evidence of any cervical radiculopathy or   plexopathy  RECOMMENDATION  Careful clinical correlation is advised  Respectfully submitted,           Francesco Arndt  [id number]  D:  05/18/2016 15:31:00  Dictated by:  Romaine Siddiqi DO,   Diplomate, American Board of Electrodiagnostic Medicine    T:  05/18/2016 17:59:30             Received for:Eric Siddiqi DO  May 19 2016  8:31AM [facility] Standard Time

## 2018-01-11 NOTE — PROGRESS NOTES
Assessment    1  Depression (311) (F32 9)   2  Decrease in appetite (783 0) (R63 0)   3  Closed nondisplaced fracture of glenoid cavity of left scapula, initial encounter (811 03)   (S42 020A)    Plan  Decrease in appetite, Depression    · Mirtazapine 7 5 MG Oral Tablet; TAKE 1 TABLET AT BEDTIME    Discussion/Summary    81 y/o woman with:  1  Left shoulder fracture: Follow-up with Ortho  2  Depression and decreased appetite: Discussed treatment options  They decline PT, aquatherapy or exercise  Will begin trial of Remeron  Discussed return parameters  Follow-up in 1 month  Chief Complaint  2 WK F/U INSOMNIA, LT SHOULDER PAIN AND UPPER EXTREMITY WEAKNESS  REVIEW LAB AND XRAY RESULTS  PT'S DAUGHTER STATED OVER THE LAST 2 WEEKS SHE HAS HAD INCREASED IRRITABILITY AND WEAKNESS  History of Present Illness  Patient is an 81 y/o woman who presents for follow-up with her daughter  Patient was seen by Ortho and diagnosed with left glenoid fracture, but opted for conservative treatment, and is doing exercises  Patient's appetite and mood have been worse of late, and patient has been upset and crying a lot, and does not want to get out of bed and do activity  Review of Systems    Constitutional: No fever, no chills, feels well, no tiredness, no recent weight gain or weight loss  Eyes: eyesight problems, but as noted in HPI    ENT: no complaints of earache, no loss of hearing, no nose bleeds, no nasal discharge, no sore throat, no hoarseness  Cardiovascular: No complaints of slow heart rate, no fast heart rate, no chest pain, no palpitations, no leg claudication, no lower extremity edema  Respiratory: No complaints of shortness of breath, no wheezing, no cough, no SOB on exertion, no orthopnea, no PND  Gastrointestinal: No complaints of abdominal pain, no constipation, no nausea or vomiting, no diarrhea, no bloody stools     Genitourinary: No complaints of dysuria, no incontinence, no pelvic pain, no dysmenorrhea, no vaginal discharge or bleeding  Musculoskeletal: arthralgias and myalgias, but as noted in HPI  Integumentary: No complaints of skin rash or lesions, no itching, no skin wounds, no breast pain or lump  Neurological: No complaints of headache, no confusion, no convulsions, no numbness, no dizziness or fainting, no tingling, no limb weakness, no difficulty walking  Psychiatric: emotional problems, but as noted in HPI  Endocrine: No complaints of proptosis, no hot flashes, no muscle weakness, no deepening of the voice, no feelings of weakness  Hematologic/Lymphatic: No complaints of swollen glands, no swollen glands in the neck, does not bleed easily, does not bruise easily  Active Problems    1  Accidental fall (E888 9) (W19 XXXA)   2  Agitation (307 9) (R45 1)   3  Arthralgia of right lower leg (719 46) (M25 561)   4  Cervical radiculopathy (723 4) (M54 12)   5  Closed nondisplaced fracture of glenoid cavity of left scapula, initial encounter (811 03)   (S42 145A)   6  Dysuria (788 1) (R30 0)   7  Insomnia (780 52) (G47 00)   8  Left shoulder pain (719 41) (M25 512)   9  Lumbar compression fracture (805 4) (S32 000A)   10  Lumbar radiculopathy (724 4) (M54 16)   11  Nocturia (788 43) (R35 1)   12  Paresthesias (782 0) (R20 2)   13  Pubic ramus fracture (808 2) (S32 509A)   14  Superficial phlebitis and thrombophlebitis of both legs (451 0) (I80 03)   15  Urinary tract infection (599 0) (N39 0)   16  Vaginal atrophy (627 3) (N95 2)   17  Vertigo (780 4) (R42)   18  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)   19  Weakness of both upper extremities (729 89) (M62 81)    Past Medical History    1  History of Chronic Wheezing   2  History of Depression (311) (F32 9)   3  History of dizziness (V13 89) (Z87 898)   4  History of heartburn (V12 79) (Z87 898)   5  History of hypertension (V12 59) (Z86 79)   6  History of Palpitations (785 1) (R00 2)    Surgical History    1   History of Hip Replacement   2  History of Reported Hx Of Hip Replacement - Left Side    Family History    1  Family history of Denial Of Any Significant Medical History    2  Family history of Father  At Age 31   1  Family history of Tuberculosis    Social History    · Denied: History of Alcohol Use (History)   · Always uses seat belt   · Daily Coffee Consumption (1  Cups/Day)   · Denied: History of Exercise frequency (times/week)   · Feels safe at home   · Living will in place   · Never A Smoker   · No drug use   · No guns in the home    Current Meds   1  ALPRAZolam 0 25 MG Oral Tablet; TAKE 1 TABLET AT BEDTIME AS NEEDED; Therapy: 71FOQ8581 to (Evaluate:2016); Last FC:[id number] Ordered   2  Aspirin  MG Oral Tablet Delayed Release; Therapy: (Recorded:2015) to Recorded   3  Atorvastatin Calcium 20 MG Oral Tablet; Therapy: (Recorded:2015) to Recorded   4  Cranberry CAPS; Therapy: (Recorded:2015) to Recorded   5  Daily Multiple Vitamins TABS; Therapy: (Recorded:2015) to Recorded   6  FluPHENAZine HCl - 5 MG Oral Tablet; TAKE 1 TABLET DAILY; Therapy: 98ZPA6921 to (Evaluate:2016)  Requested for: 29HPH9110; Last   Rx:2016 Ordered   7  LORazepam 0 5 MG Oral Tablet; Therapy: 81Dcj9456 to (Last [place] )  Requested for: 53Emm5938 Ordered   8  Meclizine HCl - 25 MG Oral Tablet; TAKE ONE TABLET BY MOUTH 3 TIMES A DAY AS   NEEDED; Therapy: 91JTZ4494 to (Evaluate:2016)  Requested for: 55GND0558; Last   Rx:2016 Ordered   9  Metoprolol Succinate ER 25 MG Oral Tablet Extended Release 24 Hour; Therapy: 06Xfe6854 to (Last Rx:78Eck7726)  Requested for: 82Xtj5016 Ordered   10  Omeprazole 20 MG Oral Capsule Delayed Release; Therapy: 83VNB2394 to Recorded   11  Vitamin D 1000 UNIT CAPS; Therapy: (Recorded:2015) to Recorded    The medication list was reviewed and updated today  Allergies    1   No Known Drug Allergies    Vitals  Vital Signs [Data Includes: Current Encounter]    Recorded: 63THG8296 51:68CN   Systolic 892   Diastolic 80   Height 4 ft 8 in   Weight 130 lb    BMI Calculated 29 15   BSA Calculated 1 48     Physical Exam    Constitutional   General appearance: No acute distress, well appearing and well nourished  Eyes   Conjunctiva and lids: No swelling, erythema or discharge  Pupils and irises: Equal, round and reactive to light  Ears, Nose, Mouth, and Throat   External inspection of ears and nose: Normal     Pulmonary   Respiratory effort: No increased work of breathing or signs of respiratory distress  Auscultation of lungs: Clear to auscultation  Cardiovascular   Auscultation of heart: Normal rate and rhythm, normal S1 and S2, without murmurs  Examination of extremities for edema and/or varicosities: Normal     Abdomen   Abdomen: Non-tender, no masses  Liver and spleen: No hepatomegaly or splenomegaly  Lymphatic   Palpation of lymph nodes in neck: No lymphadenopathy  Musculoskeletal   Gait and station: Normal     Digits and nails: Abnormal   Degenerative changes to bilateral hands  Inspection/palpation of joints, bones, and muscles: Abnormal   Decreased range of motion of shoulders, elbows and hands  Patient specifically unable to abduct left shoulder actively or passively without pain  Skin   Skin and subcutaneous tissue: Normal without rashes or lesions  Neurologic   Cranial nerves: Cranial nerves 2-12 intact  Psychiatric   Orientation to person, place, and time: Normal     Mood and affect: Normal          Future Appointments    Date/Time Provider Specialty Site   03/15/2016 11:00 AM KELLY Colon   Orthopedic Surgery [facility]     Signatures   Electronically signed by : KELLY Palacios ; Jan 22 2016 10:46AM EST                       (Author)

## 2018-01-13 VITALS
WEIGHT: 116 LBS | HEART RATE: 60 BPM | DIASTOLIC BLOOD PRESSURE: 80 MMHG | HEIGHT: 56 IN | SYSTOLIC BLOOD PRESSURE: 142 MMHG | BODY MASS INDEX: 26.1 KG/M2

## 2018-01-13 VITALS
BODY MASS INDEX: 25.87 KG/M2 | HEIGHT: 56 IN | HEART RATE: 78 BPM | WEIGHT: 115 LBS | OXYGEN SATURATION: 97 % | TEMPERATURE: 97.9 F | SYSTOLIC BLOOD PRESSURE: 120 MMHG | DIASTOLIC BLOOD PRESSURE: 78 MMHG

## 2018-01-14 VITALS
WEIGHT: 118 LBS | DIASTOLIC BLOOD PRESSURE: 72 MMHG | BODY MASS INDEX: 26.54 KG/M2 | HEART RATE: 64 BPM | SYSTOLIC BLOOD PRESSURE: 122 MMHG | HEIGHT: 56 IN

## 2018-01-14 VITALS — WEIGHT: 119 LBS | SYSTOLIC BLOOD PRESSURE: 132 MMHG | BODY MASS INDEX: 26.68 KG/M2 | DIASTOLIC BLOOD PRESSURE: 68 MMHG

## 2018-04-28 ENCOUNTER — APPOINTMENT (EMERGENCY)
Dept: RADIOLOGY | Facility: HOSPITAL | Age: 83
End: 2018-04-28
Payer: MEDICARE

## 2018-04-28 ENCOUNTER — HOSPITAL ENCOUNTER (EMERGENCY)
Facility: HOSPITAL | Age: 83
Discharge: HOME | End: 2018-04-28
Attending: EMERGENCY MEDICINE | Admitting: EMERGENCY MEDICINE
Payer: MEDICARE

## 2018-04-28 ENCOUNTER — OFFICE VISIT (OUTPATIENT)
Dept: URGENT CARE | Age: 83
End: 2018-04-28
Payer: MEDICARE

## 2018-04-28 VITALS
HEART RATE: 80 BPM | RESPIRATION RATE: 20 BRPM | SYSTOLIC BLOOD PRESSURE: 170 MMHG | WEIGHT: 120 LBS | DIASTOLIC BLOOD PRESSURE: 84 MMHG | HEIGHT: 60 IN | TEMPERATURE: 98 F | BODY MASS INDEX: 23.56 KG/M2 | OXYGEN SATURATION: 95 %

## 2018-04-28 VITALS
SYSTOLIC BLOOD PRESSURE: 185 MMHG | HEIGHT: 60 IN | HEART RATE: 71 BPM | TEMPERATURE: 98.5 F | RESPIRATION RATE: 18 BRPM | BODY MASS INDEX: 23.56 KG/M2 | OXYGEN SATURATION: 95 % | WEIGHT: 120 LBS | DIASTOLIC BLOOD PRESSURE: 89 MMHG

## 2018-04-28 DIAGNOSIS — R53.1 WEAKNESS: Primary | ICD-10-CM

## 2018-04-28 DIAGNOSIS — R53.1 WEAKNESS GENERALIZED: Primary | ICD-10-CM

## 2018-04-28 LAB
ALBUMIN SERPL BCP-MCNC: 3.3 G/DL (ref 3.5–5)
ALP SERPL-CCNC: 112 U/L (ref 46–116)
ALT SERPL W P-5'-P-CCNC: 22 U/L (ref 12–78)
ANION GAP SERPL CALCULATED.3IONS-SCNC: 2 MMOL/L (ref 4–13)
AST SERPL W P-5'-P-CCNC: 16 U/L (ref 5–45)
BACTERIA UR QL AUTO: ABNORMAL /HPF
BASOPHILS # BLD AUTO: 0.02 THOUSANDS/ΜL (ref 0–0.1)
BASOPHILS NFR BLD AUTO: 0 % (ref 0–1)
BILIRUB SERPL-MCNC: 0.34 MG/DL (ref 0.2–1)
BILIRUB UR QL STRIP: NEGATIVE
BUN SERPL-MCNC: 21 MG/DL (ref 5–25)
CALCIUM SERPL-MCNC: 9 MG/DL (ref 8.3–10.1)
CHLORIDE SERPL-SCNC: 97 MMOL/L (ref 100–108)
CLARITY UR: CLEAR
CO2 SERPL-SCNC: 33 MMOL/L (ref 21–32)
COLOR UR: YELLOW
COLOR, POC: NORMAL
CREAT SERPL-MCNC: 0.79 MG/DL (ref 0.6–1.3)
EOSINOPHIL # BLD AUTO: 0.05 THOUSAND/ΜL (ref 0–0.61)
EOSINOPHIL NFR BLD AUTO: 1 % (ref 0–6)
ERYTHROCYTE [DISTWIDTH] IN BLOOD BY AUTOMATED COUNT: 13.2 % (ref 11.6–15.1)
GFR SERPL CREATININE-BSD FRML MDRD: 67 ML/MIN/1.73SQ M
GLUCOSE SERPL-MCNC: 101 MG/DL (ref 65–140)
GLUCOSE UR STRIP-MCNC: NEGATIVE MG/DL
HCT VFR BLD AUTO: 43.6 % (ref 34.8–46.1)
HGB BLD-MCNC: 14.1 G/DL (ref 11.5–15.4)
HGB UR QL STRIP.AUTO: ABNORMAL
HYALINE CASTS #/AREA URNS LPF: ABNORMAL /LPF
KETONES UR STRIP-MCNC: NEGATIVE MG/DL
LEUKOCYTE ESTERASE UR QL STRIP: ABNORMAL
LYMPHOCYTES # BLD AUTO: 1.83 THOUSANDS/ΜL (ref 0.6–4.47)
LYMPHOCYTES NFR BLD AUTO: 24 % (ref 14–44)
MCH RBC QN AUTO: 28.2 PG (ref 26.8–34.3)
MCHC RBC AUTO-ENTMCNC: 32.3 G/DL (ref 31.4–37.4)
MCV RBC AUTO: 87 FL (ref 82–98)
MONOCYTES # BLD AUTO: 0.76 THOUSAND/ΜL (ref 0.17–1.22)
MONOCYTES NFR BLD AUTO: 10 % (ref 4–12)
NEUTROPHILS # BLD AUTO: 5.07 THOUSANDS/ΜL (ref 1.85–7.62)
NEUTS SEG NFR BLD AUTO: 65 % (ref 43–75)
NITRITE UR QL STRIP: NEGATIVE
NON-SQ EPI CELLS URNS QL MICRO: ABNORMAL /HPF
NRBC BLD AUTO-RTO: 0 /100 WBCS
PH UR STRIP.AUTO: 7 [PH] (ref 4.5–8)
PLATELET # BLD AUTO: 190 THOUSANDS/UL (ref 149–390)
PMV BLD AUTO: 10.7 FL (ref 8.9–12.7)
POTASSIUM SERPL-SCNC: 4.5 MMOL/L (ref 3.5–5.3)
PROT SERPL-MCNC: 7.3 G/DL (ref 6.4–8.2)
PROT UR STRIP-MCNC: NEGATIVE MG/DL
RBC # BLD AUTO: 5 MILLION/UL (ref 3.81–5.12)
RBC #/AREA URNS AUTO: ABNORMAL /HPF
SODIUM SERPL-SCNC: 132 MMOL/L (ref 136–145)
SP GR UR STRIP.AUTO: 1.01 (ref 1–1.03)
TROPONIN I SERPL-MCNC: <0.02 NG/ML
UROBILINOGEN UR QL STRIP.AUTO: 0.2 E.U./DL
WBC # BLD AUTO: 7.76 THOUSAND/UL (ref 4.31–10.16)
WBC #/AREA URNS AUTO: ABNORMAL /HPF

## 2018-04-28 PROCEDURE — 81001 URINALYSIS AUTO W/SCOPE: CPT

## 2018-04-28 PROCEDURE — 36415 COLL VENOUS BLD VENIPUNCTURE: CPT | Performed by: EMERGENCY MEDICINE

## 2018-04-28 PROCEDURE — 71046 X-RAY EXAM CHEST 2 VIEWS: CPT

## 2018-04-28 PROCEDURE — 80053 COMPREHEN METABOLIC PANEL: CPT | Performed by: EMERGENCY MEDICINE

## 2018-04-28 PROCEDURE — G0463 HOSPITAL OUTPT CLINIC VISIT: HCPCS | Performed by: NURSE PRACTITIONER

## 2018-04-28 PROCEDURE — 87086 URINE CULTURE/COLONY COUNT: CPT

## 2018-04-28 PROCEDURE — 84484 ASSAY OF TROPONIN QUANT: CPT | Performed by: EMERGENCY MEDICINE

## 2018-04-28 PROCEDURE — 99285 EMERGENCY DEPT VISIT HI MDM: CPT

## 2018-04-28 PROCEDURE — 85025 COMPLETE CBC W/AUTO DIFF WBC: CPT | Performed by: EMERGENCY MEDICINE

## 2018-04-28 PROCEDURE — 93005 ELECTROCARDIOGRAM TRACING: CPT | Performed by: NURSE PRACTITIONER

## 2018-04-28 PROCEDURE — 99213 OFFICE O/P EST LOW 20 MIN: CPT | Performed by: NURSE PRACTITIONER

## 2018-04-28 PROCEDURE — 81002 URINALYSIS NONAUTO W/O SCOPE: CPT | Performed by: EMERGENCY MEDICINE

## 2018-04-28 RX ORDER — CURCUMIN 100 %
POWDER (GRAM) MISCELLANEOUS
COMMUNITY

## 2018-04-28 RX ORDER — ALPRAZOLAM 0.25 MG/1
TABLET ORAL
Refills: 0 | COMMUNITY
Start: 2018-04-10 | End: 2018-05-10 | Stop reason: SDUPTHER

## 2018-04-28 RX ORDER — MELATONIN
1000 DAILY
COMMUNITY

## 2018-04-28 NOTE — PATIENT INSTRUCTIONS
Recommended patient to go to [place] [address] Emergency department for further evaluation  Offered to call an ambulance but the family decline  Patients daughter-in-law to drive her

## 2018-04-29 LAB — BACTERIA UR CULT: NORMAL

## 2018-04-29 NOTE — DISCHARGE INSTRUCTIONS
Weakness   WHAT YOU NEED TO KNOW:   Weakness is a loss of muscle strength  It may be caused by brain, nerve, or muscle problems  Physical and mental conditions such as heart problems, pregnancy, dehydration, or depression may also cause weakness  Reactions to certain drugs can cause weakness  Parts of your body may become weak if you need to wear a cast or splint or have been on bed rest for a long time  DISCHARGE INSTRUCTIONS:   Call 911 for any of the following:   · You have any of the following signs of a stroke:      ¨ Numbness or drooping on one side of your face     ¨ Weakness in an arm or leg    ¨ Confusion or difficulty speaking    ¨ Dizziness, a severe headache, or vision loss    · You lose feeling in your weakened body area  · You have electric shock-like feelings down your arms and legs when you flex or move your neck  · You have sudden or increased trouble speaking, swallowing, or breathing  Return to the emergency department if:   · You have severe pain in your back, arms, or legs that worsens  · You have sudden or worsened muscle weakness or loss of movement  · You are not able to control when you urinate or have a bowel movement  Contact your healthcare provider if:   · You feel depressed or anxious  · You have questions or concerns about your condition or care  Manage weakness:   · Use assistive devices as directed  These help protect you from injury  Examples include a walker or cane  Have someone install handrails in your home  These will help you get out of a bathtub or stand up from a toilet  Use a shower chair so you can sit while you shower  Sit down on the toilet or another chair to dry off and put on your clothes  Get help going up and down stairs if your legs are weak  · Go to physical or occupational therapy if directed  A physical therapist can teach you exercises to help strengthen weak muscles   An occupational therapist can show you ways to do your daily activities more easily  For example, light forks and spoons can be easier to use if you have hand weakness  You may also learn ways to organize your household items so you are not moving heavy items  · Balance rest with exercise  Exercise can help increase your muscle strength and energy  Do not exercise for long periods at a time  Take breaks often to rest  Too much exercise can cause muscle strain or make you more tired  Ask your healthcare provider how much exercise is right for you  · Eat a variety of healthy foods  Too much or too little food may cause weakness or tiredness  Ask your healthcare provider what a healthy amount of food is for you  Healthy foods include fruits, vegetables, whole-grain breads, low-fat dairy products, lean meats and fish, nuts, and cooked beans  · Do not smoke  Nicotine and other chemicals in cigarettes and cigars can make your symptoms worse, and can cause lung damage  Ask your healthcare provider for information if you currently smoke and need help to quit  E-cigarettes or smokeless tobacco still contain nicotine  Talk to your healthcare provider before you use these products  · Do not use caffeine, alcohol, or illegal drugs  These may cause muscle twitching, which could lead to worsened weakness  Follow up with your healthcare provider as directed:  Write down your questions so you remember to ask them during your visits  © 2017 [address] Information is for End User's use only and may not be sold, redistributed or otherwise used for commercial purposes  All illustrations and images included in CareNotes® are the copyrighted property of [employer]  or [employer]avio Blackburn  The above information is an  only  It is not intended as medical advice for individual conditions or treatments  Talk to your doctor, nurse or pharmacist before following any medical regimen to see if it is safe and effective for you

## 2018-04-29 NOTE — ED PROVIDER NOTES
History  Chief Complaint   Patient presents with    Weakness - Generalized     Dizziness, headache, SOB, weakness x1 week     80year-old female with multiple medical problems presenting to the ER today with a chief complaint of weakness diffuse myalgia  Patient was taken to an urgent care referred to the ER for further evaluation treatment  States she is always weak and has myalgia but symptoms have worsened over the past 2 days  History provided by:  Patient   used: No        Prior to Admission Medications   Prescriptions Last Dose Informant Patient Reported? Taking?    ALPRAZolam (XANAX) 0 25 mg tablet 4/28/2018 at Unknown time  Yes Yes   Sig: TK 1 T PO NIGHTLY PRA OR SLEEP   LORazepam (ATIVAN) 0 5 mg tablet 4/28/2018 at Unknown time  Yes Yes   Sig: Take 0 5 mg by mouth every 6 (six) hours as needed for anxiety   Multiple Vitamins-Minerals (VITAMENT PO) 4/28/2018 at Unknown time Self Yes Yes   Sig: Take by mouth   Turmeric, Curcuma Longa, (Emma Mulligan) POWD 4/28/2018 at Unknown time Self Yes Yes   Sig: by Does not apply route   aspirin 81 MG tablet 4/28/2018 at Unknown time  Yes Yes   Sig: Take 325 mg by mouth daily     cholecalciferol (VITAMIN D3) 1,000 units tablet 4/28/2018 at Unknown time Self Yes Yes   Sig: Take 1,000 Units by mouth daily   fluPHENAZine (PROLIXIN) 5 mg tablet 4/28/2018 at Unknown time  Yes Yes   Sig: Take 2 5 mg by mouth daily   metoprolol tartrate (LOPRESSOR) 25 mg tablet 4/28/2018 at Unknown time  Yes Yes   Sig: Take 25 mg by mouth 2 (two) times a day   omeprazole (PriLOSEC) 20 mg delayed release capsule   No No   Sig: Take 1 capsule by mouth daily for 30 days      Facility-Administered Medications: None       Past Medical History:   Diagnosis Date    Anxiety     Depression     Hypertension     Palpitations     last assessed: 11/04/15    Skin lesion     last assessed: 07/27/16    Stomach ache     Wheezing     Chronic       Past Surgical History:   Procedure Laterality Date    FEMUR SURGERY Right     TOTAL HIP ARTHROPLASTY Left 2007    TOTAL HIP ARTHROPLASTY Right        Family History   Problem Relation Age of Onset    Tuberculosis Father      I have reviewed and agree with the history as documented  Social History   Substance Use Topics    Smoking status: Never Smoker    Smokeless tobacco: Never Used    Alcohol use No        Review of Systems   Constitutional: Negative  Negative for appetite change, chills, diaphoresis, fatigue and fever  HENT: Negative  Eyes: Negative  Respiratory: Negative  Cardiovascular: Negative  Gastrointestinal: Negative for abdominal pain, blood in stool, constipation, diarrhea, nausea and vomiting  Endocrine: Negative  Genitourinary: Negative for decreased urine volume, difficulty urinating, dyspareunia, dysuria, flank pain, frequency, hematuria, pelvic pain, urgency, vaginal bleeding, vaginal discharge and vaginal pain  Musculoskeletal: Positive for myalgias  Skin: Negative  Allergic/Immunologic: Negative  Neurological: Negative  Psychiatric/Behavioral: Negative  All other systems reviewed and are negative  Physical Exam  ED Triage Vitals [04/28/18 1829]   Temperature Pulse Respirations Blood Pressure SpO2   98 5 °F (36 9 °C) 77 18 (!) 181/82 96 %      Temp Source Heart Rate Source Patient Position - Orthostatic VS BP Location FiO2 (%)   Tympanic Monitor Sitting Left arm --      Pain Score       No Pain           Orthostatic Vital Signs  Vitals:    04/28/18 1829 [birth year]/1933   BP: (!) 181/82 (!) 185/89   Pulse: 77 71   Patient Position - Orthostatic VS: Sitting Lying       Physical Exam   Constitutional: She is oriented to person, place, and time  She appears well-developed and well-nourished  HENT:   Head: Normocephalic and atraumatic     Right Ear: External ear normal    Left Ear: External ear normal    Nose: Nose normal    Mouth/Throat: Oropharynx is clear and moist    Eyes: Conjunctivae and EOM are normal  Pupils are equal, round, and reactive to light  Neck: Normal range of motion  Neck supple  No JVD present  No tracheal deviation present  No thyromegaly present  Cardiovascular: Normal rate, regular rhythm, normal heart sounds and intact distal pulses  Exam reveals no gallop and no friction rub  No murmur heard  Pulmonary/Chest: Effort normal and breath sounds normal  No stridor  No respiratory distress  She has no wheezes  She has no rales  She exhibits no tenderness  Abdominal: Soft  Bowel sounds are normal  She exhibits no distension and no mass  There is no tenderness  There is no rebound and no guarding  No hernia  Musculoskeletal: Normal range of motion  She exhibits no edema, tenderness or deformity  Lymphadenopathy:     She has no cervical adenopathy  Neurological: She is alert and oriented to person, place, and time  She has normal reflexes  She displays normal reflexes  No cranial nerve deficit  She exhibits normal muscle tone  Coordination normal    Skin: Skin is warm  No rash noted  No erythema  No pallor  Psychiatric: She has a normal mood and affect  Her behavior is normal  Judgment and thought content normal    Nursing note and vitals reviewed  ED Medications  Medications - No data to display    Diagnostic Studies  Results Reviewed     Procedure Component Value Units Date/Time    Urine Microscopic [[id number]]  (Abnormal) Collected:  04/28/18 1858    Lab Status:  Final result Specimen:  Urine from Urine, Clean Catch Updated:  04/28/18 1935     RBC, UA None Seen /hpf      WBC, UA 20-30 (A) /hpf      Epithelial Cells Occasional /hpf      Bacteria, UA Moderate (A) /hpf      Hyaline Casts, UA 5-10 (A) /lpf     Urine culture [[id number]] Collected:  04/28/18 1858    Lab Status:   In process Specimen:  Urine from Urine, Clean Catch Updated:  04/28/18 1935    POCT urinalysis dipstick [[id number]]  (Normal) Resulted:  04/1933    Lab Status:  Final result Specimen:  Urine Updated:  04/1933     Color, UA completed    Comprehensive metabolic panel [[id number]]  (Abnormal) Collected:  04/28/18 1856    Lab Status:  Final result Specimen:  Blood from Arm, Left Updated:  04/28/18 1926     Sodium 132 (L) mmol/L      Potassium 4 5 mmol/L      Chloride 97 (L) mmol/L      CO2 33 (H) mmol/L      Anion Gap 2 (L) mmol/L      BUN 21 mg/dL      Creatinine 0 79 mg/dL      Glucose 101 mg/dL      Calcium 9 0 mg/dL      AST 16 U/L      ALT 22 U/L      Alkaline Phosphatase 112 U/L      Total Protein 7 3 g/dL      Albumin 3 3 (L) g/dL      Total Bilirubin 0 34 mg/dL      eGFR 67 ml/min/1 73sq m     Narrative:         National Kidney Disease Education Program recommendations are as follows:  GFR calculation is accurate only with a steady state creatinine  Chronic Kidney disease less than 60 ml/min/1 73 sq  meters  Kidney failure less than 15 ml/min/1 73 sq  meters  Troponin I [[id number]]  (Normal) Collected:  04/28/18 1858    Lab Status:  Final result Specimen:  Blood from Arm, Left Updated:  04/28/18 1926     Troponin I <0 02 ng/mL     Narrative:         [employer] Chemistry analyzer 99% cutoff is > 0 04 ng/mL in network labs    o cTnI 99% cutoff is useful only when applied to patients in the clinical setting of myocardial ischemia  o cTnI 99% cutoff should be interpreted in the context of clinical history, ECG findings and possibly cardiac imaging to establish correct diagnosis  o cTnI 99% cutoff may be suggestive but clearly not indicative of a coronary event without the clinical setting of myocardial ischemia      CBC and differential [[id number]] Collected:  04/28/18 1856    Lab Status:  Final result Specimen:  Blood from Arm, Left Updated:  04/28/18 1912     WBC 7 76 Thousand/uL      RBC 5 00 Million/uL      Hemoglobin 14 1 g/dL      Hematocrit 43 6 %      MCV 87 fL      MCH 28 2 pg      MCHC 32 3 g/dL      RDW 13 2 %      MPV 10 7 fL      Platelets 131 Thousands/uL      nRBC 0 /100 WBCs      Neutrophils Relative 65 %      Lymphocytes Relative 24 %      Monocytes Relative 10 %      Eosinophils Relative 1 %      Basophils Relative 0 %      Neutrophils Absolute 5 07 Thousands/µL      Lymphocytes Absolute 1 83 Thousands/µL      Monocytes Absolute 0 76 Thousand/µL      Eosinophils Absolute 0 05 Thousand/µL      Basophils Absolute 0 02 Thousands/µL     ED Urine Macroscopic [[id number]]  (Abnormal) Collected:  04/28/18 1858    Lab Status:  Final result Specimen:  Urine Updated:  04/28/18 1854     Color, UA Yellow     Clarity, UA Clear     pH, UA 7 0     Leukocytes, UA Large (A)     Nitrite, UA Negative     Protein, UA Negative mg/dl      Glucose, UA Negative mg/dl      Ketones, UA Negative mg/dl      Urobilinogen, UA 0 2 E U /dl      Bilirubin, UA Negative     Blood, UA Trace (A)     Specific [place], UA 1 015    Narrative:       CLINITEK RESULT                 XR chest 2 views   Final Result by Annetta Coronado MD (04/28 2012)      Evaluation is limited by patient rotation  Emphysematous changes are noted  No focal consolidation, pleural effusion, or pneumothorax  Workstation performed: [id number]               Procedures  Procedures      Phone Consults  ED Phone Contact    ED Course  ED Course as of Apr 28 2346   Sat Apr 28, 2018   1854 Leukocytes, UA: (!) Large   1935 Bacteria, UA: (!) Moderate   2018 Offered patient and patient's family admission given patient's weakness  Patient states she would like to go home  Family is adamant that patient should go home  Identification of Seniors at Risk      Most Recent Value   (ISAR) Identification of Seniors at Risk   Before the illness or injury that brought you to the Emergency, did you need someone to help you on a regular basis? 1 Filed at: 04/28/2018 1829   In the last 24 hours, have you needed more help than usual?  1 Filed at: 04/28/2018 1829   Have you been hospitalized for one or more nights during the past 6 months? 0 Filed at: 04/28/2018 1829   In general, do you see well? 1 Filed at: 04/28/2018 1829   In general, do you have serious problems with your memory? 1 Filed at: 04/28/2018 1829   Do you take more than three different medications every day? 1 Filed at: 04/28/2018 1829   ISAR Score  5 Filed at: 04/28/2018 1829                          MDM  Number of Diagnoses or Management Options  Weakness: new and requires workup     Amount and/or Complexity of Data Reviewed  Clinical lab tests: ordered and reviewed  Tests in the radiology section of CPT®: ordered and reviewed  Tests in the medicine section of CPT®: reviewed and ordered  Decide to obtain previous medical records or to obtain history from someone other than the patient: yes  Independent visualization of images, tracings, or specimens: yes    Patient Progress  Patient progress: stable    CritCare Time    Disposition  Final diagnoses:   Weakness     Time reflects when diagnosis was documented in both MDM as applicable and the Disposition within this note     Time User Action Codes Description Comment    4/28/2018  8:18 PM Steven Hansen [R53 1] Weakness       ED Disposition     ED Disposition Condition Comment    Discharge  Judy Oneal discharge to home/self care      Condition at discharge: Good        Follow-up Information     Follow up With Specialties Details Why Contact Info    Tommy Crowley MD Family Medicine Schedule an appointment as soon as possible for a visit  [address] [facility]  [phone]          Discharge Medication List as of 4/28/2018  8:19 PM      CONTINUE these medications which have NOT CHANGED    Details   ALPRAZolam (XANAX) 0 25 mg tablet TK 1 T PO NIGHTLY PRA OR SLEEP, Historical Med      aspirin 81 MG tablet Take 325 mg by mouth daily  , Historical Med      cholecalciferol (VITAMIN D3) 1,000 units tablet Take 1,000 Units by mouth daily, Historical Med      fluPHENAZine (PROLIXIN) 5 mg tablet Take 2 5 mg by mouth daily, Until Discontinued, Historical Med      LORazepam (ATIVAN) 0 5 mg tablet Take 0 5 mg by mouth every 6 (six) hours as needed for anxiety, Until Discontinued, Historical Med      metoprolol tartrate (LOPRESSOR) 25 mg tablet Take 25 mg by mouth 2 (two) times a day, Until Discontinued, Historical Med      Multiple Vitamins-Minerals (VITAMENT PO) Take by mouth, Historical Med      Turmeric, Curcuma Longa, ([employer]) POWD by Does not apply route, Historical Med      omeprazole (PriLOSEC) 20 mg delayed release capsule Take 1 capsule by mouth daily for 30 days, Starting Mon 1/30/2017, Until Sat 12/2/2017, Print           No discharge procedures on file  ED Provider  Attending physically available and evaluated Shena Oneal I managed the patient along with the ED Attending      Electronically Signed by         Tristin He DO  04/28/18 2879

## 2018-04-30 LAB
ATRIAL RATE: 77 BPM
P AXIS: 57 DEGREES
PR INTERVAL: 160 MS
QRS AXIS: -40 DEGREES
QRSD INTERVAL: 72 MS
QT INTERVAL: 356 MS
QTC INTERVAL: 402 MS
T WAVE AXIS: 30 DEGREES
VENTRICULAR RATE: 77 BPM

## 2018-04-30 PROCEDURE — 93010 ELECTROCARDIOGRAM REPORT: CPT | Performed by: INTERNAL MEDICINE

## 2018-05-04 ENCOUNTER — TELEPHONE (OUTPATIENT)
Dept: EMERGENCY DEPT | Facility: HOSPITAL | Age: 83
End: 2018-05-04

## 2018-05-04 DIAGNOSIS — R53.1 WEAKNESS: Primary | ICD-10-CM

## 2018-05-04 NOTE — TELEPHONE ENCOUNTER
pts daughter called to report continued weakness  Reviewed urine culture - mixed contaminants  Recommended repeat urinalysis with culture  I rx'd a new urine collection at [facility] lab  Pt should be able to go to any [facility] outpt lab to have urine collected and tested

## 2018-05-10 ENCOUNTER — OFFICE VISIT (OUTPATIENT)
Dept: FAMILY MEDICINE CLINIC | Facility: CLINIC | Age: 83
End: 2018-05-10
Payer: MEDICARE

## 2018-05-10 VITALS
BODY MASS INDEX: 24.99 KG/M2 | DIASTOLIC BLOOD PRESSURE: 78 MMHG | SYSTOLIC BLOOD PRESSURE: 130 MMHG | WEIGHT: 108 LBS | TEMPERATURE: 98.4 F | HEIGHT: 55 IN

## 2018-05-10 DIAGNOSIS — I10 BENIGN ESSENTIAL HYPERTENSION: ICD-10-CM

## 2018-05-10 DIAGNOSIS — F32.A DEPRESSION, UNSPECIFIED DEPRESSION TYPE: Primary | ICD-10-CM

## 2018-05-10 DIAGNOSIS — Z00.00 ROUTINE ADULT HEALTH MAINTENANCE: ICD-10-CM

## 2018-05-10 DIAGNOSIS — F33.3 MAJOR DEPRESSIVE DISORDER, RECURRENT EPISODE, SEVERE, WITH PSYCHOTIC BEHAVIOR (HCC): ICD-10-CM

## 2018-05-10 DIAGNOSIS — R63.4 WEIGHT LOSS: ICD-10-CM

## 2018-05-10 PROBLEM — E53.8 VITAMIN B12 DEFICIENCY: Status: ACTIVE | Noted: 2017-01-04

## 2018-05-10 PROBLEM — G89.18 PAIN FOLLOWING ORAL SURGERY: Status: ACTIVE | Noted: 2017-01-04

## 2018-05-10 PROBLEM — E87.1 HYPONATREMIA: Status: ACTIVE | Noted: 2017-11-08

## 2018-05-10 PROBLEM — H92.01 EARACHE SYMPTOMS IN RIGHT EAR: Status: ACTIVE | Noted: 2017-06-08

## 2018-05-10 PROBLEM — I35.1 AI (AORTIC INSUFFICIENCY): Status: ACTIVE | Noted: 2017-06-14

## 2018-05-10 PROBLEM — R26.2 AMBULATORY DYSFUNCTION: Status: ACTIVE | Noted: 2017-01-04

## 2018-05-10 PROBLEM — N39.0 ACUTE UTI: Status: ACTIVE | Noted: 2017-02-10

## 2018-05-10 PROBLEM — M62.81 GENERALIZED MUSCLE WEAKNESS: Status: ACTIVE | Noted: 2017-11-29

## 2018-05-10 PROBLEM — R11.2 NAUSEA AND VOMITING: Status: ACTIVE | Noted: 2017-02-10

## 2018-05-10 PROBLEM — K21.9 GERD WITHOUT ESOPHAGITIS: Status: ACTIVE | Noted: 2017-06-21

## 2018-05-10 PROBLEM — N39.0 RECURRENT UTI: Status: ACTIVE | Noted: 2017-08-09

## 2018-05-10 PROBLEM — E78.5 BORDERLINE HYPERLIPIDEMIA: Status: ACTIVE | Noted: 2017-01-04
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
## 2018-05-10 PROCEDURE — 99214 OFFICE O/P EST MOD 30 MIN: CPT | Performed by: FAMILY MEDICINE

## 2018-05-10 PROCEDURE — G0439 PPPS, SUBSEQ VISIT: HCPCS | Performed by: FAMILY MEDICINE

## 2018-05-10 RX ORDER — FLUPHENAZINE HYDROCHLORIDE 5 MG/1
5 TABLET ORAL DAILY
Qty: 90 TABLET | Refills: 1 | Status: SHIPPED | OUTPATIENT
Start: 2018-05-10 | End: 2018-09-27 | Stop reason: SDUPTHER

## 2018-05-10 RX ORDER — ALPRAZOLAM 0.25 MG/1
0.25 TABLET ORAL 2 TIMES DAILY PRN
Qty: 60 TABLET | Refills: 1 | Status: SHIPPED | OUTPATIENT
Start: 2018-05-10 | End: 2018-07-10 | Stop reason: SDUPTHER

## 2018-05-10 RX ORDER — METOPROLOL SUCCINATE 25 MG/1
25 TABLET, EXTENDED RELEASE ORAL DAILY
Qty: 90 TABLET | Refills: 1 | Status: SHIPPED | OUTPATIENT
Start: 2018-05-10 | End: 2018-09-27 | Stop reason: SDUPTHER

## 2018-05-10 RX ORDER — VENLAFAXINE HYDROCHLORIDE 37.5 MG/1
37.5 CAPSULE, EXTENDED RELEASE ORAL DAILY
Qty: 30 CAPSULE | Refills: 0 | Status: SHIPPED | OUTPATIENT
Start: 2018-05-10 | End: 2018-09-27

## 2018-05-10 NOTE — PROGRESS NOTES
Assessment/Plan:    51-year-old woman with: Major depressive disorder, hypertension and weight loss  Discussed workup and treatment options at length with risks and benefits  Continue current medications  Will begin trial of Effexor to see if we can help her mood somewhat  Advised that if symptoms worsen or not improving they should call back otherwise will follow up in 1 month to discuss patient's progress  Reviewed patient's recent blood work from the ED as well  No problem-specific Assessment & Plan notes found for this encounter  Diagnoses and all orders for this visit:    Depression, unspecified depression type  -     venlafaxine (EFFEXOR-XR) 37 5 mg 24 hr capsule; Take 1 capsule (37 5 mg total) by mouth daily  -     ALPRAZolam (XANAX) 0 25 mg tablet; Take 1 tablet (0 25 mg total) by mouth 2 (two) times a day as needed for anxiety  -     fluPHENAZine (PROLIXIN) 5 mg tablet; Take 1 tablet (5 mg total) by mouth daily    Major depressive disorder, recurrent episode, severe, with psychotic behavior ([facility] [address] )    Benign essential hypertension  -     metoprolol succinate (TOPROL-XL) 25 mg 24 hr tablet; Take 1 tablet (25 mg total) by mouth daily  -     Discontinue: metoprolol tartrate (LOPRESSOR) 25 mg tablet; Take 1 tablet (25 mg total) by mouth 2 (two) times a day  -     metoprolol tartrate (LOPRESSOR) 25 mg tablet; Take 1 tablet (25 mg total) by mouth 2 (two) times a day    Weight loss          Subjective:   Chief Complaint   Patient presents with    Anorexia    Fatigue    Medicare Wellness Visit    Depression    Hypertension     concern about elevated bp           Patient ID: Helen Oneal is a 80 y o  female  Patient is an 51-year-old woman who presents with her daughter for follow-up  Patient has major depressive disorder with psychotic features along with marked fatigue  Patient has also had issues longstanding with her appetite and has had some weight loss    Patient also has hypertension that is generally been well controlled her metoprolol  Patient admits her mood is worsen  No suicidal or homicidal ideation  Patient has tried several SSRIs in the past which have been unsuccessful or had caused side effects  They would like to try something new to help her symptoms  No other new complaints      Fatigue   Associated symptoms include fatigue and weakness  Depression   Associated symptoms include fatigue and weakness  Hypertension         The following portions of the patient's history were reviewed and updated as appropriate: allergies, current medications, past family history, past medical history, past social history, past surgical history and problem list     Review of Systems   Constitutional: Positive for appetite change, fatigue and unexpected weight change  HENT: Negative  Eyes: Negative  Respiratory: Negative  Cardiovascular: Negative  Gastrointestinal: Negative  Endocrine: Negative  Genitourinary: Negative  Musculoskeletal: Negative  Allergic/Immunologic: Negative  Neurological: Positive for weakness  Hematological: Negative  Psychiatric/Behavioral: Positive for depression and dysphoric mood  All other systems reviewed and are negative  Objective:      /78 (BP Location: Left arm, Patient Position: Sitting, Cuff Size: Standard)   Temp 98 4 °F (36 9 °C) (Tympanic)   Ht 4' 6" (1 372 m)   Wt 49 kg (108 lb)   BMI 26 04 kg/m²          Physical Exam   Constitutional: She is oriented to person, place, and time  She appears well-developed and well-nourished  HENT:   Head: Atraumatic  Right Ear: External ear normal    Left Ear: External ear normal    Eyes: Conjunctivae and EOM are normal  Pupils are equal, round, and reactive to light  Neck: Normal range of motion  Cardiovascular: Normal rate, regular rhythm and normal heart sounds  Pulmonary/Chest: Effort normal and breath sounds normal  No respiratory distress  Abdominal: Soft  Bowel sounds are normal  She exhibits no distension  There is no tenderness  There is no rebound and no guarding  Musculoskeletal: Normal range of motion  Patient ambulates with walker   Neurological: She is alert and oriented to person, place, and time  No cranial nerve deficit  Skin: Skin is warm and dry  Psychiatric: She has a normal mood and affect   Her behavior is normal  Judgment and thought content normal

## 2018-05-10 NOTE — PROGRESS NOTES
HPI:  Thelma Oneal is a 80 y o  female here for her Subsequent Wellness Visit  Patient Active Problem List   Diagnosis   (none) - all problems resolved or deleted     Past Medical History:   Diagnosis Date    Anxiety     Depression     Hypertension     Palpitations     last assessed: 11/04/15    Skin lesion     last assessed: 07/27/16    Stomach ache     Wheezing     Chronic     Past Surgical History:   Procedure Laterality Date    FEMUR SURGERY Right     TOTAL HIP ARTHROPLASTY Left 2007    TOTAL HIP ARTHROPLASTY Right      Family History   Problem Relation Age of Onset    Tuberculosis Father      History   Smoking Status    Never Smoker   Smokeless Tobacco    Never Used     History   Alcohol Use No      History   Drug Use No     There were no vitals taken for this visit  Current Outpatient Prescriptions   Medication Sig Dispense Refill    ALPRAZolam (XANAX) 0 25 mg tablet TK 1 T PO NIGHTLY PRA OR SLEEP  0    aspirin 81 MG tablet Take 325 mg by mouth daily        cholecalciferol (VITAMIN D3) 1,000 units tablet Take 1,000 Units by mouth daily      fluPHENAZine (PROLIXIN) 5 mg tablet Take 2 5 mg by mouth daily      LORazepam (ATIVAN) 0 5 mg tablet Take 0 5 mg by mouth every 6 (six) hours as needed for anxiety      metoprolol tartrate (LOPRESSOR) 25 mg tablet Take 25 mg by mouth 2 (two) times a day      Multiple Vitamins-Minerals (VITAMENT PO) Take by mouth      omeprazole (PriLOSEC) 20 mg delayed release capsule Take 1 capsule by mouth daily for 30 days 30 capsule 0    Turmeric, Curcuma Longa, (CURCUMIN) POWD by Does not apply route       No current facility-administered medications for this visit        No Known Allergies  Immunization History   Administered Date(s) Administered    Td (adult), Unspecified 11/25/2000    Td (adult), adsorbed 09/27/2008       Patient Care Team:  Sudeep Olvera MD as PCP - General  MD Zenon Christopher MD Dustin Arrow, MD Monta Duet CHETNA [place]      Medicare Screening Tests and Risk Assessments:  [facility] Clinical     ISAR:   Previous hospitalizations?:  No       Once in a Lifetime Medicare Screening:       Medicare Screening Tests and Risk Assessment:   AAA Risk Assessment    Osteoporosis Risk Assessment    HIV Risk Assessment        Drug and Alcohol Use:   Tobacco use    Cigarettes:  never smoker    Tobacco use duration    Tobacco Cessation Readiness    Alcohol use    Alcohol use:  never    Alcohol Treatment Readiness   Illicit Drug Use    Drug use:  never        Diet & Exercise:   Diet   What is your diet?:  Regular   How many servings a day of the following:   Fruits and Vegetables:  1-2 Meat:  1-2   Whole Grains:  2 Simple Carbs:  1   Dairy:  5 or more Soda:  1, 0   Coffee:  0, 1 Tea:  1   Exercise    Do you currently exercise?:  unable to exercise       Cognitive Impairment Screening:   Cognitive Impairment Screening    Do you have difficulty learning or retaining new information?:  Yes Do you have difficulty handling new tasks?:  Yes   Do you have difficulty with reasoning?:  Yes    Do you have difficulty with language?:  Yes Do you have difficulty with behavior?:  Yes       Functional Ability/Level of Safety:   Hearing    Hearing difficulties:  No Bilateral:  slightly decreased   Left:  slightly decreased Right:  slightly decreased   Hearing aid:  No    Hearing Impairment Assessment    Hearing status:  Hard of hearing   Do your family members ever complain that you turn on the radio or T V  too loudly?:  No Do you find that other people have to repeat themselves when talking to you?:  Yes   Do you have difficulty hearing while talking on the phone?:  Yes Has anyone ever told you that you are speaking too loudly when talking with them?:  No   Do you have trouble hearing the doorbell or phone ringing?:  No Do you have difficulty hearing such that you feel frustrated talking to people?:  Yes   Do you feel sad because you cannot hear well?:  Yes Do you feel inconvienced due to your hearing problem?:  Yes   Do you think you would be a happier person if you could hear better?:  Yes Would you be willing to go for a hearing aid fitting if suggested?:  Yes   Current Activities    Help needed with the folllowing:    Using the phone:  Yes Transportation:  Yes   Shopping:  Yes Preparing Meals:  Yes   Doing Housework:  Yes Doing Laundry:  Yes   Managing Medications:  Yes Managing Money:  Yes   ADL    Feeding:  Minimum assistance   Oral hygiene and Facial grooming:  Dependant for all tasks   Bathing:  Dependant for all tasks   Upper Body Dressing:  Dependant for all tasks   Lower Body Dressing:  Dependant for all tasks   Toileting:  Dependant for all tasks   Bed Mobility:  Dependant for all tasks   Fall Risk   Have you fallen in the last 12 months?:  Yes Are you unsteady on your feet?:  Yes    Are you taking any medications that may cause fatigue or dizziness?:  No    Do you rush to the bathroom potentially risking a fall?:  No   Injury History       Home Safety:   Are there hazards in your environment?:  No   If you fell, would you need help to get back up from the ground?:  No Do you have problems or concerns getting in/out of a bed, chair, tub, or toilet?:  Yes   Do you feel unsteady when walking?:  Yes Is your activity limited by pain?:  Yes   Do you have handrails and grab-bars in the home?:  Yes Are emergency numbers kept by the phone and regularly updated?:  Yes   Are you and/or family members aware of the dangers of smoking in bed?:  No Are firearms stored securely?:  No   Do you have working smoke alarms and fire extinguisher?:  Yes Do all household members know how to use them?:  Yes   Have you left the stove on unsupervised?:  No    Home Safety Risk Factors   Unfamilar with surroundings:  No Uneven floors:  No   Stairs or handrail saftey risk:  Yes Loose rugs:  No   Household clutter:  No Poor household lighting:  No   No grab bars in bathroom:   Yes Further evaluation needed:  No       Advanced Directives:   Advanced Directives    Living Will:  Yes Durable POA for healthcare: Yes   Advanced directive:  Yes    Patient's End of Life Decisions        Urinary Incontinence:   Do you have urinary incontinence?:  Yes Do you have incomplete emptying?:  Yes   Do you urinate frequently?:  Yes Do you have urinary urgency?:  Yes   Do you have urinary hesitancy?:  Yes Do you have dysuria (painful and/or difficult urination)?:  No   Do you have nocturia (waking up to urinate)?:  Yes Do you strain when urinating (have to push to urinate)?:  No   Do you have a weak stream when urinating?:  No Do you have intermittent streaming when urinating?:  No   Do you dribble urine after finishing?:  Yes    Do you have vaginal pressure?:  No Do you have vaginal dryness?:  No       Glaucoma:            Provider Screening    No data filed        No exam data present  Reviewed Updated [facility] Prior Wellness Visits:   Last Medicare wellness visit information was reviewed, patient interviewed , no change since last AWVno  Last Medicare wellness visit information was reviewed, patient interviewed and updates made to the record today yes    Assessment and Plan:  27-year-old woman with:  Medicare annual well visit  Encourage full diet and discussed various safety and health maintenance issues  Discussed supportive care return parameters  No diagnosis found  There are no preventive care reminders to display for this patient

## 2018-05-11 NOTE — ED ATTENDING ATTESTATION
Darion Hines, DO, saw and evaluated the patient  I have discussed the patient with the resident/non-physician practitioner and agree with the resident's/non-physician practitioner's findings, Plan of Care, and MDM as documented in the resident's/non-physician practitioner's note, except where noted  All available labs and Radiology studies were reviewed  At this point I agree with the current assessment done in the Emergency Department  I have conducted an independent evaluation of this patient a history and physical is as follows:    81 y/o with weakness, myalgia, seen at urgent care prior  Noted dirty urine, hypertension  Pt at baseline  Plan offer observation for extended monitoring  At present family wants to go home with patient  Hemodynamically stable for DC, will abide      Critical Care Time  CritCare Time    Procedures

## 2018-07-10 DIAGNOSIS — F32.A DEPRESSION, UNSPECIFIED DEPRESSION TYPE: ICD-10-CM

## 2018-07-11 RX ORDER — ALPRAZOLAM 0.25 MG/1
0.25 TABLET ORAL 2 TIMES DAILY PRN
Qty: 60 TABLET | Refills: 0 | Status: SHIPPED | OUTPATIENT
Start: 2018-07-11 | End: 2018-08-22 | Stop reason: SDUPTHER

## 2018-08-22 DIAGNOSIS — F32.A DEPRESSION, UNSPECIFIED DEPRESSION TYPE: ICD-10-CM

## 2018-08-22 RX ORDER — ALPRAZOLAM 0.25 MG/1
0.25 TABLET ORAL 2 TIMES DAILY PRN
Qty: 60 TABLET | Refills: 0 | Status: SHIPPED | OUTPATIENT
Start: 2018-08-22 | End: 2018-09-27 | Stop reason: SDUPTHER

## 2018-09-27 ENCOUNTER — OFFICE VISIT (OUTPATIENT)
Dept: FAMILY MEDICINE CLINIC | Facility: CLINIC | Age: 83
End: 2018-09-27
Payer: MEDICARE

## 2018-09-27 VITALS
SYSTOLIC BLOOD PRESSURE: 140 MMHG | DIASTOLIC BLOOD PRESSURE: 80 MMHG | BODY MASS INDEX: 26.96 KG/M2 | WEIGHT: 111.8 LBS | TEMPERATURE: 97.3 F

## 2018-09-27 DIAGNOSIS — F32.A DEPRESSION, UNSPECIFIED DEPRESSION TYPE: ICD-10-CM

## 2018-09-27 DIAGNOSIS — I10 BENIGN ESSENTIAL HYPERTENSION: ICD-10-CM

## 2018-09-27 DIAGNOSIS — E87.1 HYPONATREMIA: ICD-10-CM

## 2018-09-27 DIAGNOSIS — I48.0 PAF (PAROXYSMAL ATRIAL FIBRILLATION) (HCC): Primary | ICD-10-CM

## 2018-09-27 PROCEDURE — 99214 OFFICE O/P EST MOD 30 MIN: CPT | Performed by: FAMILY MEDICINE

## 2018-09-27 RX ORDER — ESCITALOPRAM OXALATE 5 MG/1
5 TABLET ORAL DAILY
Qty: 30 TABLET | Refills: 0 | Status: SHIPPED | OUTPATIENT
Start: 2018-09-27 | End: 2019-06-28 | Stop reason: ALTCHOICE

## 2018-09-27 RX ORDER — ALPRAZOLAM 0.25 MG/1
0.25 TABLET ORAL 2 TIMES DAILY PRN
Qty: 60 TABLET | Refills: 1 | Status: SHIPPED | OUTPATIENT
Start: 2018-09-27 | End: 2019-01-16 | Stop reason: SDUPTHER

## 2018-09-27 RX ORDER — FLUPHENAZINE HYDROCHLORIDE 5 MG/1
5 TABLET ORAL DAILY
Qty: 90 TABLET | Refills: 0 | Status: CANCELLED | OUTPATIENT
Start: 2018-09-27

## 2018-09-27 RX ORDER — ARIPIPRAZOLE 2 MG/1
2 TABLET ORAL DAILY
Qty: 30 TABLET | Refills: 0 | Status: SHIPPED | OUTPATIENT
Start: 2018-09-27 | End: 2019-01-16

## 2018-09-27 RX ORDER — FLUPHENAZINE HYDROCHLORIDE 5 MG/1
5 TABLET ORAL DAILY
Qty: 90 TABLET | Refills: 0 | Status: SHIPPED | OUTPATIENT
Start: 2018-09-27 | End: 2018-11-26 | Stop reason: SDUPTHER

## 2018-09-27 RX ORDER — METOPROLOL SUCCINATE 25 MG/1
25 TABLET, EXTENDED RELEASE ORAL DAILY
Qty: 90 TABLET | Refills: 1 | Status: SHIPPED | OUTPATIENT
Start: 2018-09-27 | End: 2019-01-16 | Stop reason: SDUPTHER

## 2018-09-27 RX ORDER — OMEPRAZOLE 20 MG/1
20 CAPSULE, DELAYED RELEASE ORAL DAILY
Qty: 90 CAPSULE | Refills: 1 | Status: SHIPPED | OUTPATIENT
Start: 2018-09-27 | End: 2019-06-28

## 2018-09-27 NOTE — PROGRESS NOTES
Assessment/Plan:    40-year-old woman with:  Depression, paroxysmal AFib, hypertension hyponatremia  Discussed workup and treatment options with risks and benefits  Will check fasting blood work  Discussed the risks and benefits of various medications and will attempt to switch to Abilify instead of the Prolixin and will attempt to wean from sertraline to Lexapro to see if this leads to some better control of patient's symptoms  Discussed limiting Xanax as possible to try to limit sleepiness during the day  Discussed supportive care return parameters and follow-up in 1 month  No problem-specific Assessment & Plan notes found for this encounter  Diagnoses and all orders for this visit:    PAF (paroxysmal atrial fibrillation) (HCC)  -     CBC and differential; Future  -     Comprehensive metabolic panel; Future  -     TSH, 3rd generation with Free T4 reflex; Future  -     Urinalysis with reflex to microscopic  -     Lipid Panel with Direct LDL reflex; Future  -     Osmolality; Future  -     Osmolality, urine  -     Sodium, urine, random    Depression, unspecified depression type  -     ALPRAZolam (XANAX) 0 25 mg tablet; Take 1 tablet (0 25 mg total) by mouth 2 (two) times a day as needed for anxiety  -     omeprazole (PriLOSEC) 20 mg delayed release capsule; Take 1 capsule (20 mg total) by mouth daily for 30 days  -     fluPHENAZine (PROLIXIN) 5 mg tablet; Take 1 tablet (5 mg total) by mouth daily  -     ARIPiprazole (ABILIFY) 2 mg tablet; Take 1 tablet (2 mg total) by mouth daily  -     escitalopram (LEXAPRO) 5 mg tablet; Take 1 tablet (5 mg total) by mouth daily  -     CBC and differential; Future  -     Comprehensive metabolic panel; Future  -     TSH, 3rd generation with Free T4 reflex; Future  -     Urinalysis with reflex to microscopic  -     Lipid Panel with Direct LDL reflex; Future  -     Osmolality;  Future  -     Osmolality, urine  -     Sodium, urine, random    Benign essential hypertension  - metoprolol succinate (TOPROL-XL) 25 mg 24 hr tablet; Take 1 tablet (25 mg total) by mouth daily  -     metoprolol tartrate (LOPRESSOR) 25 mg tablet; Take 1 tablet (25 mg total) by mouth 2 (two) times a day  -     omeprazole (PriLOSEC) 20 mg delayed release capsule; Take 1 capsule (20 mg total) by mouth daily for 30 days  -     ARIPiprazole (ABILIFY) 2 mg tablet; Take 1 tablet (2 mg total) by mouth daily  -     escitalopram (LEXAPRO) 5 mg tablet; Take 1 tablet (5 mg total) by mouth daily  -     CBC and differential; Future  -     Comprehensive metabolic panel; Future  -     TSH, 3rd generation with Free T4 reflex; Future  -     Urinalysis with reflex to microscopic  -     Lipid Panel with Direct LDL reflex; Future  -     Osmolality; Future  -     Osmolality, urine  -     Sodium, urine, random    Hyponatremia  -     CBC and differential; Future  -     Comprehensive metabolic panel; Future  -     TSH, 3rd generation with Free T4 reflex; Future  -     Urinalysis with reflex to microscopic  -     Lipid Panel with Direct LDL reflex; Future  -     Osmolality; Future  -     Osmolality, urine  -     Sodium, urine, random    Other orders  -     Cancel: fluPHENAZine (PROLIXIN) 5 mg tablet; Take 1 tablet (5 mg total) by mouth daily          Subjective:   Chief Complaint   Patient presents with    Follow-up     Patient in office for checkup   Medication Refill     Patient needs refills, Pt's daughter would like physical script  Review medications   Immunizations     Offered flu vaccine, was Declined          Patient ID: Yair Oneal is a 80 y o  female  Patient is an 80-year-old woman who presents with her daughter for follow-up on depression, paroxysmal AFib, hypertension and hyponatremia  Patient has had breakthrough symptoms especially weakness and depression and they would like to try changing her maintenance medications  No suicidal homicidal ideation  Patient does have weakness    She is due for follow-up blood work  No fevers chills nausea vomiting  Tolerating p o  intake  Medication Refill   Associated symptoms include fatigue and weakness  The following portions of the patient's history were reviewed and updated as appropriate: allergies, current medications, past family history, past medical history, past social history, past surgical history and problem list     Review of Systems   Constitutional: Positive for fatigue  HENT: Negative  Eyes: Negative  Respiratory: Negative  Cardiovascular: Negative  Gastrointestinal: Negative  Endocrine: Negative  Genitourinary: Negative  Musculoskeletal: Negative  Allergic/Immunologic: Negative  Neurological: Positive for weakness  Hematological: Negative  Psychiatric/Behavioral: The patient is nervous/anxious  All other systems reviewed and are negative  Objective:      /80 (BP Location: Right arm, Patient Position: Sitting, Cuff Size: Standard)   Temp (!) 97 3 °F (36 3 °C) (Tympanic)   Wt 50 7 kg (111 lb 12 8 oz)   BMI 26 96 kg/m²          Physical Exam   Constitutional: She is oriented to person, place, and time  She appears well-developed and well-nourished  HENT:   Head: Atraumatic  Right Ear: External ear normal    Left Ear: External ear normal    Eyes: Pupils are equal, round, and reactive to light  Conjunctivae and EOM are normal    Neck: Normal range of motion  Cardiovascular: Normal rate, regular rhythm and normal heart sounds  Pulmonary/Chest: Effort normal and breath sounds normal  No respiratory distress  Abdominal: Soft  She exhibits no distension  There is no tenderness  There is no rebound and no guarding  Musculoskeletal: Normal range of motion  Ambulates with walker   Neurological: She is alert and oriented to person, place, and time  No cranial nerve deficit  Skin: Skin is warm and dry  Psychiatric: She has a normal mood and affect   Her behavior is normal  Judgment and thought content normal

## 2018-10-08 ENCOUNTER — TRANSCRIBE ORDERS (OUTPATIENT)
Dept: LAB | Facility: HOSPITAL | Age: 83
End: 2018-10-08

## 2018-10-08 DIAGNOSIS — I48.91 ATRIAL FIBRILLATION, UNSPECIFIED TYPE (HCC): ICD-10-CM

## 2018-10-08 DIAGNOSIS — I10 ESSENTIAL HYPERTENSION, MALIGNANT: Primary | ICD-10-CM

## 2018-10-08 DIAGNOSIS — E87.1 HYPOSMOLALITY SYNDROME: ICD-10-CM

## 2018-10-11 ENCOUNTER — LAB (OUTPATIENT)
Dept: LAB | Facility: HOSPITAL | Age: 83
End: 2018-10-11
Payer: MEDICARE

## 2018-10-11 DIAGNOSIS — F32.A DEPRESSION, UNSPECIFIED DEPRESSION TYPE: ICD-10-CM

## 2018-10-11 DIAGNOSIS — E87.1 HYPONATREMIA: ICD-10-CM

## 2018-10-11 DIAGNOSIS — I10 BENIGN ESSENTIAL HYPERTENSION: ICD-10-CM

## 2018-10-11 DIAGNOSIS — I48.0 PAF (PAROXYSMAL ATRIAL FIBRILLATION) (HCC): ICD-10-CM

## 2018-10-11 LAB
ALBUMIN SERPL BCP-MCNC: 3.3 G/DL (ref 3.5–5)
ALP SERPL-CCNC: 123 U/L (ref 46–116)
ALT SERPL W P-5'-P-CCNC: 21 U/L (ref 12–78)
ANION GAP SERPL CALCULATED.3IONS-SCNC: 6 MMOL/L (ref 4–13)
AST SERPL W P-5'-P-CCNC: 16 U/L (ref 5–45)
BACTERIA UR QL AUTO: ABNORMAL /HPF
BASOPHILS # BLD AUTO: 0.03 THOUSANDS/ΜL (ref 0–0.1)
BASOPHILS NFR BLD AUTO: 1 % (ref 0–1)
BILIRUB SERPL-MCNC: 0.45 MG/DL (ref 0.2–1)
BILIRUB UR QL STRIP: NEGATIVE
BUN SERPL-MCNC: 9 MG/DL (ref 5–25)
CALCIUM SERPL-MCNC: 9.1 MG/DL (ref 8.3–10.1)
CHLORIDE SERPL-SCNC: 100 MMOL/L (ref 100–108)
CHOLEST SERPL-MCNC: 236 MG/DL (ref 50–200)
CLARITY UR: ABNORMAL
CO2 SERPL-SCNC: 31 MMOL/L (ref 21–32)
COLOR UR: YELLOW
CREAT SERPL-MCNC: 0.64 MG/DL (ref 0.6–1.3)
EOSINOPHIL # BLD AUTO: 0.11 THOUSAND/ΜL (ref 0–0.61)
EOSINOPHIL NFR BLD AUTO: 2 % (ref 0–6)
ERYTHROCYTE [DISTWIDTH] IN BLOOD BY AUTOMATED COUNT: 13.2 % (ref 11.6–15.1)
GFR SERPL CREATININE-BSD FRML MDRD: 80 ML/MIN/1.73SQ M
GLUCOSE P FAST SERPL-MCNC: 82 MG/DL (ref 65–99)
GLUCOSE UR STRIP-MCNC: NEGATIVE MG/DL
HCT VFR BLD AUTO: 43.8 % (ref 34.8–46.1)
HDLC SERPL-MCNC: 62 MG/DL (ref 40–60)
HGB BLD-MCNC: 13.9 G/DL (ref 11.5–15.4)
HGB UR QL STRIP.AUTO: NEGATIVE
HYALINE CASTS #/AREA URNS LPF: ABNORMAL /LPF
IMM GRANULOCYTES # BLD AUTO: 0.04 THOUSAND/UL (ref 0–0.2)
IMM GRANULOCYTES NFR BLD AUTO: 1 % (ref 0–2)
KETONES UR STRIP-MCNC: NEGATIVE MG/DL
LDLC SERPL CALC-MCNC: 144 MG/DL (ref 0–100)
LEUKOCYTE ESTERASE UR QL STRIP: ABNORMAL
LYMPHOCYTES # BLD AUTO: 1.38 THOUSANDS/ΜL (ref 0.6–4.47)
LYMPHOCYTES NFR BLD AUTO: 23 % (ref 14–44)
MCH RBC QN AUTO: 27.9 PG (ref 26.8–34.3)
MCHC RBC AUTO-ENTMCNC: 31.7 G/DL (ref 31.4–37.4)
MCV RBC AUTO: 88 FL (ref 82–98)
MONOCYTES # BLD AUTO: 0.66 THOUSAND/ΜL (ref 0.17–1.22)
MONOCYTES NFR BLD AUTO: 11 % (ref 4–12)
NEUTROPHILS # BLD AUTO: 3.88 THOUSANDS/ΜL (ref 1.85–7.62)
NEUTS SEG NFR BLD AUTO: 62 % (ref 43–75)
NITRITE UR QL STRIP: POSITIVE
NON-SQ EPI CELLS URNS QL MICRO: ABNORMAL /HPF
NRBC BLD AUTO-RTO: 0 /100 WBCS
OSMOLALITY UR/SERPL-RTO: 286 MMOL/KG (ref 282–298)
OSMOLALITY UR: 320 MMOL/KG
PH UR STRIP.AUTO: 7.5 [PH] (ref 4.5–8)
PLATELET # BLD AUTO: 205 THOUSANDS/UL (ref 149–390)
PMV BLD AUTO: 10.8 FL (ref 8.9–12.7)
POTASSIUM SERPL-SCNC: 3.9 MMOL/L (ref 3.5–5.3)
PROT SERPL-MCNC: 7.1 G/DL (ref 6.4–8.2)
PROT UR STRIP-MCNC: NEGATIVE MG/DL
RBC # BLD AUTO: 4.98 MILLION/UL (ref 3.81–5.12)
RBC #/AREA URNS AUTO: ABNORMAL /HPF
SODIUM 24H UR-SCNC: 73 MOL/L
SODIUM SERPL-SCNC: 137 MMOL/L (ref 136–145)
SP GR UR STRIP.AUTO: 1.01 (ref 1–1.03)
TRIGL SERPL-MCNC: 149 MG/DL
TSH SERPL DL<=0.05 MIU/L-ACNC: 1.79 UIU/ML (ref 0.36–3.74)
UROBILINOGEN UR QL STRIP.AUTO: 0.2 E.U./DL
VENIPUNCTURE: NORMAL
WBC # BLD AUTO: 6.1 THOUSAND/UL (ref 4.31–10.16)
WBC #/AREA URNS AUTO: ABNORMAL /HPF

## 2018-10-11 PROCEDURE — 36415 COLL VENOUS BLD VENIPUNCTURE: CPT

## 2018-10-11 PROCEDURE — 83930 ASSAY OF BLOOD OSMOLALITY: CPT

## 2018-10-11 PROCEDURE — 80061 LIPID PANEL: CPT

## 2018-10-11 PROCEDURE — 80053 COMPREHEN METABOLIC PANEL: CPT

## 2018-10-11 PROCEDURE — 84443 ASSAY THYROID STIM HORMONE: CPT

## 2018-10-11 PROCEDURE — 85025 COMPLETE CBC W/AUTO DIFF WBC: CPT

## 2018-10-11 PROCEDURE — 84300 ASSAY OF URINE SODIUM: CPT | Performed by: FAMILY MEDICINE

## 2018-10-11 PROCEDURE — 81001 URINALYSIS AUTO W/SCOPE: CPT | Performed by: FAMILY MEDICINE

## 2018-10-11 PROCEDURE — 83935 ASSAY OF URINE OSMOLALITY: CPT | Performed by: FAMILY MEDICINE

## 2018-10-18 ENCOUNTER — TELEPHONE (OUTPATIENT)
Dept: FAMILY MEDICINE CLINIC | Facility: CLINIC | Age: 83
End: 2018-10-18

## 2018-10-18 DIAGNOSIS — N39.0 URINARY TRACT INFECTION WITHOUT HEMATURIA, SITE UNSPECIFIED: Primary | ICD-10-CM

## 2018-10-18 RX ORDER — CEPHALEXIN 500 MG/1
500 CAPSULE ORAL EVERY 12 HOURS SCHEDULED
Qty: 14 CAPSULE | Refills: 0 | Status: SHIPPED | OUTPATIENT
Start: 2018-10-18 | End: 2018-10-25

## 2018-11-26 DIAGNOSIS — F32.A DEPRESSION, UNSPECIFIED DEPRESSION TYPE: ICD-10-CM

## 2018-11-27 RX ORDER — FLUPHENAZINE HYDROCHLORIDE 5 MG/1
TABLET ORAL
Qty: 90 TABLET | Refills: 1 | Status: SHIPPED | OUTPATIENT
Start: 2018-11-27 | End: 2019-01-16 | Stop reason: SDUPTHER

## 2019-01-16 ENCOUNTER — OFFICE VISIT (OUTPATIENT)
Dept: FAMILY MEDICINE CLINIC | Facility: CLINIC | Age: 84
End: 2019-01-16
Payer: MEDICARE

## 2019-01-16 VITALS
HEIGHT: 55 IN | WEIGHT: 110 LBS | TEMPERATURE: 99.2 F | BODY MASS INDEX: 25.46 KG/M2 | DIASTOLIC BLOOD PRESSURE: 64 MMHG | SYSTOLIC BLOOD PRESSURE: 122 MMHG

## 2019-01-16 DIAGNOSIS — E56.9 VITAMIN DEFICIENCY: ICD-10-CM

## 2019-01-16 DIAGNOSIS — M62.81 GENERALIZED MUSCLE WEAKNESS: ICD-10-CM

## 2019-01-16 DIAGNOSIS — E87.1 HYPONATREMIA: Primary | ICD-10-CM

## 2019-01-16 DIAGNOSIS — I10 BENIGN ESSENTIAL HYPERTENSION: ICD-10-CM

## 2019-01-16 DIAGNOSIS — F32.A DEPRESSION, UNSPECIFIED DEPRESSION TYPE: ICD-10-CM

## 2019-01-16 PROCEDURE — 99214 OFFICE O/P EST MOD 30 MIN: CPT | Performed by: FAMILY MEDICINE

## 2019-01-16 RX ORDER — CYANOCOBALAMIN 1000 UG/ML
1000 INJECTION INTRAMUSCULAR; SUBCUTANEOUS
Status: SHIPPED | OUTPATIENT
Start: 2019-01-16

## 2019-01-16 RX ORDER — METOPROLOL SUCCINATE 25 MG/1
25 TABLET, EXTENDED RELEASE ORAL DAILY
Qty: 90 TABLET | Refills: 1 | Status: SHIPPED | OUTPATIENT
Start: 2019-01-16

## 2019-01-16 RX ORDER — FLUPHENAZINE HYDROCHLORIDE 5 MG/1
5 TABLET ORAL DAILY
Qty: 90 TABLET | Refills: 1 | Status: SHIPPED | OUTPATIENT
Start: 2019-01-16 | End: 2019-06-28 | Stop reason: SDUPTHER

## 2019-01-16 RX ORDER — ALPRAZOLAM 0.25 MG/1
0.25 TABLET ORAL 2 TIMES DAILY PRN
Qty: 60 TABLET | Refills: 1 | Status: SHIPPED | OUTPATIENT
Start: 2019-01-16 | End: 2019-04-25 | Stop reason: SDUPTHER

## 2019-01-16 RX ADMIN — CYANOCOBALAMIN 1000 MCG: 1000 INJECTION INTRAMUSCULAR; SUBCUTANEOUS at 14:51

## 2019-01-16 NOTE — PROGRESS NOTES
Assessment/Plan:    60-year-old woman with:  hyponatremia, generalized weakness, hypertension, and depression  Discussed supportive care return parameters  Continue current medications  Check labs and will refer for home PT and OT follow-up in 4 months  No problem-specific Assessment & Plan notes found for this encounter  Diagnoses and all orders for this visit:    Hyponatremia  -     CBC and differential; Future  -     Comprehensive metabolic panel; Future  -     Ambulatory referral to Physical Therapy; Future  -     Ambulatory referral to Occupational Therapy; Future    Depression, unspecified depression type  -     ALPRAZolam (XANAX) 0 25 mg tablet; Take 1 tablet (0 25 mg total) by mouth 2 (two) times a day as needed for anxiety  -     fluPHENAZine (PROLIXIN) 5 mg tablet; Take 1 tablet (5 mg total) by mouth daily  -     CBC and differential; Future  -     Comprehensive metabolic panel; Future  -     Ambulatory referral to Physical Therapy; Future  -     Ambulatory referral to Occupational Therapy; Future    Benign essential hypertension  -     metoprolol tartrate (LOPRESSOR) 25 mg tablet; Take 1 tablet (25 mg total) by mouth 2 (two) times a day  -     metoprolol succinate (TOPROL-XL) 25 mg 24 hr tablet; Take 1 tablet (25 mg total) by mouth daily  -     CBC and differential; Future  -     Comprehensive metabolic panel; Future  -     Ambulatory referral to Physical Therapy; Future  -     Ambulatory referral to Occupational Therapy; Future    Generalized muscle weakness  -     CBC and differential; Future  -     Comprehensive metabolic panel; Future  -     Ambulatory referral to Physical Therapy; Future  -     Ambulatory referral to Occupational Therapy; Future    Vitamin deficiency  -     cyanocobalamin injection 1,000 mcg;  Inject 1 mL (1,000 mcg total) into a muscle every 30 (thirty) days           Subjective:     Chief Complaint   Patient presents with    Follow-up     patients daughter reports Melody Collet and having Vertigo  Requesing updated labs  Patient unsteady at standing on scale  Patient ID: Mark Oneal is a 80 y o  female  Patient is an 80-year-old woman who presents for follow-up on hyponatremia, generalized weakness, hypertension, and depression  Patient and her daughter admit that she has been generally stable but she is getting weaker and she would like to do some home therapy  No fevers chills nausea vomiting  Tolerating p o  intake  No other complaints at this time        The following portions of the patient's history were reviewed and updated as appropriate: allergies, current medications, past family history, past medical history, past social history, past surgical history and problem list     Review of Systems   HENT: Negative  Eyes: Negative  Respiratory: Negative  Cardiovascular: Negative  Gastrointestinal: Negative  Endocrine: Negative  Genitourinary: Negative  Musculoskeletal: Negative  Allergic/Immunologic: Negative  Neurological: Positive for weakness  Hematological: Negative  Psychiatric/Behavioral: Positive for dysphoric mood  All other systems reviewed and are negative  Objective:      /64 (BP Location: Left arm, Patient Position: Sitting)   Temp 99 2 °F (37 3 °C)   Ht 4' 6" (1 372 m)   Wt 49 9 kg (110 lb)   BMI 26 52 kg/m²          Physical Exam   Constitutional: She is oriented to person, place, and time  She appears well-developed and well-nourished  HENT:   Head: Atraumatic  Right Ear: External ear normal    Left Ear: External ear normal    Eyes: Pupils are equal, round, and reactive to light  Conjunctivae and EOM are normal    Neck: Normal range of motion  Cardiovascular: Normal rate, regular rhythm and normal heart sounds  Pulmonary/Chest: Effort normal and breath sounds normal  No respiratory distress  Abdominal: Soft  She exhibits no distension  There is no tenderness   There is no rebound and no guarding  Musculoskeletal: Normal range of motion  Neurological: She is alert and oriented to person, place, and time  No cranial nerve deficit  Skin: Skin is warm and dry  Psychiatric: She has a normal mood and affect   Her behavior is normal  Judgment and thought content normal

## 2019-02-04 ENCOUNTER — EVALUATION (OUTPATIENT)
Dept: PHYSICAL THERAPY | Facility: CLINIC | Age: 84
End: 2019-02-04
Payer: MEDICARE

## 2019-02-04 DIAGNOSIS — R53.1 WEAKNESS: Primary | ICD-10-CM

## 2019-02-04 DIAGNOSIS — H81.12 BENIGN PAROXYSMAL POSITIONAL VERTIGO OF LEFT EAR: ICD-10-CM

## 2019-02-04 PROCEDURE — 97162 PT EVAL MOD COMPLEX 30 MIN: CPT | Performed by: PHYSICAL THERAPIST

## 2019-02-04 PROCEDURE — 97112 NEUROMUSCULAR REEDUCATION: CPT | Performed by: PHYSICAL THERAPIST

## 2019-02-04 NOTE — PROGRESS NOTES
PT Evaluation     Today's date: 2019  Patient name: Tisha Freeman  : 1929  MRN: [id number]  Referring provider: Anibal Carmona MD  Dx:   Encounter Diagnosis   Name Primary?  Weakness Yes                  Subjective Evaluation    History of Present Illness  Mechanism of injury: Patient arrived to PT in transport chair  Reports spinning sensation getting out of bed or just sitting  Went to ENT- no test performed  Patient requested to see therapy  Patient repots through translation she is dizzy and experienced dizziness right now  As per daughter patient is now depressed, loss function of her hands  Dysequilibrium: Yes - walks with walker  Lightheadedness: Yes  Vertigo: Yes  Rocking or Swaying: Yes feels like she is going to "fall off the chair"  Oscillopsia: No  Diplopia: Yes - difficulty with reading, can't focus things start to spin  Motion sickness: Yes  Floating, Swimming, Disconnected: Yes    Exacerbation Factors:  Bending over: Yes  Turning Head: Yes  Rolling in bed: Yes  Walking: Yes  Looking up: Yes  Supine to/from sitting: Yes  Optokinetic movement: Yes  Walking in busy environment: unsure doesn't go to the grocery store  Tinnitus:Yes left  Aural Fullness:Yes left  Known hearing loss:Yes  Nausea, Vomiting: Yes - vomitted  Yesterday jared zelaya unsure if related to dizziness   Does experience nausea  Altered Vision: Yes  Waiting for hearing airs   Poor Concentration: Yes  Memory Loss: Yes  Peripheral 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, restless legs at nigh secondary to medication  Cervical Pain: No  Headache: Yes    Pain  No pain reported          Objective  PT/OT Neuro Exam      PHYSICAL FINDINGS:  Oculomotor testing : Not Performed        Dynamic Visual Acuity:NP  Dynamic Head: 20/  Static Head: 20/      MCTSIB - NP    DHI: 86  0-30 mild , 30-60 moderate,  severe disability      Positional testi Left  [facility]  (+) transient upbeating left rotation nystagmus last less than 1 min   Roll test:  NT        Assessment  Assessment details: Patient required min ax2 for wheelchair to table transfer  Demonstrated BPPV with left posterior canalithiasis  Did not tolerate Epley CRT could not roll to right side with help from PT and daughter  Trialed Kickfire - was able to tolerated with increased dizziness lying to the left vs  The right  Patient did not wish to continue with the session, unable to retest for symptoms with left emily-hallpike  Educated patient's daughter that symptoms may be worsened or present for the next 24-48 hours due to testing and manuever performed today  Patient did not wish to schedule further appointments  Impairments: abnormal gait and activity intolerance  Other impairment: BPPV    Symptom irritability: highUnderstanding of Dx/Px/POC: poor   Prognosis: poor    Plan  Plan details: No treatment indicated   Patient to discharge therapy  Treatment plan discussed with: patient and family        Precautions: Non- enlglish speaking, Hypertension, dizziness, generalized weakness  Daily Treatment Diary     Manual                                                                                   Exercise Diary                                                                                                                                                                                                                                                                                      Modalities

## 2019-04-08 ENCOUNTER — TRANSCRIBE ORDERS (OUTPATIENT)
Dept: LAB | Facility: HOSPITAL | Age: 84
End: 2019-04-08

## 2019-04-08 DIAGNOSIS — E87.1 HYPOSMOLALITY SYNDROME: Primary | ICD-10-CM

## 2019-04-09 ENCOUNTER — TELEPHONE (OUTPATIENT)
Dept: FAMILY MEDICINE CLINIC | Facility: CLINIC | Age: 84
End: 2019-04-09

## 2019-04-09 ENCOUNTER — APPOINTMENT (OUTPATIENT)
Dept: LAB | Facility: HOSPITAL | Age: 84
End: 2019-04-09
Payer: MEDICARE

## 2019-04-09 DIAGNOSIS — E87.1 HYPONATREMIA: ICD-10-CM

## 2019-04-09 DIAGNOSIS — I10 BENIGN ESSENTIAL HYPERTENSION: ICD-10-CM

## 2019-04-09 DIAGNOSIS — M62.81 GENERALIZED MUSCLE WEAKNESS: ICD-10-CM

## 2019-04-09 DIAGNOSIS — F32.A DEPRESSION, UNSPECIFIED DEPRESSION TYPE: ICD-10-CM

## 2019-04-09 DIAGNOSIS — D62 ACUTE POSTHEMORRHAGIC ANEMIA: Primary | ICD-10-CM

## 2019-04-09 DIAGNOSIS — E87.1 HYPOSMOLALITY SYNDROME: ICD-10-CM

## 2019-04-09 LAB
ALBUMIN SERPL BCP-MCNC: 3.4 G/DL (ref 3.5–5)
ALP SERPL-CCNC: 94 U/L (ref 46–116)
ALT SERPL W P-5'-P-CCNC: 20 U/L (ref 12–78)
ANION GAP SERPL CALCULATED.3IONS-SCNC: 1 MMOL/L (ref 4–13)
AST SERPL W P-5'-P-CCNC: 15 U/L (ref 5–45)
BASOPHILS # BLD AUTO: 0.03 THOUSANDS/ΜL (ref 0–0.1)
BASOPHILS NFR BLD AUTO: 1 % (ref 0–1)
BILIRUB SERPL-MCNC: 0.47 MG/DL (ref 0.2–1)
BUN SERPL-MCNC: 19 MG/DL (ref 5–25)
CALCIUM SERPL-MCNC: 9 MG/DL (ref 8.3–10.1)
CHLORIDE SERPL-SCNC: 102 MMOL/L (ref 100–108)
CO2 SERPL-SCNC: 33 MMOL/L (ref 21–32)
CREAT SERPL-MCNC: 0.69 MG/DL (ref 0.6–1.3)
EOSINOPHIL # BLD AUTO: 0.09 THOUSAND/ΜL (ref 0–0.61)
EOSINOPHIL NFR BLD AUTO: 2 % (ref 0–6)
ERYTHROCYTE [DISTWIDTH] IN BLOOD BY AUTOMATED COUNT: 13.6 % (ref 11.6–15.1)
GFR SERPL CREATININE-BSD FRML MDRD: 77 ML/MIN/1.73SQ M
GLUCOSE P FAST SERPL-MCNC: 122 MG/DL (ref 65–99)
HCT VFR BLD AUTO: 39.6 % (ref 34.8–46.1)
HGB BLD-MCNC: 12.3 G/DL (ref 11.5–15.4)
IMM GRANULOCYTES # BLD AUTO: 0.03 THOUSAND/UL (ref 0–0.2)
IMM GRANULOCYTES NFR BLD AUTO: 1 % (ref 0–2)
LYMPHOCYTES # BLD AUTO: 1.03 THOUSANDS/ΜL (ref 0.6–4.47)
LYMPHOCYTES NFR BLD AUTO: 20 % (ref 14–44)
MCH RBC QN AUTO: 27.6 PG (ref 26.8–34.3)
MCHC RBC AUTO-ENTMCNC: 31.1 G/DL (ref 31.4–37.4)
MCV RBC AUTO: 89 FL (ref 82–98)
MONOCYTES # BLD AUTO: 0.59 THOUSAND/ΜL (ref 0.17–1.22)
MONOCYTES NFR BLD AUTO: 11 % (ref 4–12)
NEUTROPHILS # BLD AUTO: 3.4 THOUSANDS/ΜL (ref 1.85–7.62)
NEUTS SEG NFR BLD AUTO: 65 % (ref 43–75)
NRBC BLD AUTO-RTO: 0 /100 WBCS
PLATELET # BLD AUTO: 178 THOUSANDS/UL (ref 149–390)
PMV BLD AUTO: 11.2 FL (ref 8.9–12.7)
POTASSIUM SERPL-SCNC: 4.4 MMOL/L (ref 3.5–5.3)
PROT SERPL-MCNC: 7 G/DL (ref 6.4–8.2)
RBC # BLD AUTO: 4.45 MILLION/UL (ref 3.81–5.12)
SODIUM SERPL-SCNC: 136 MMOL/L (ref 136–145)
VENIPUNCTURE: NORMAL
WBC # BLD AUTO: 5.17 THOUSAND/UL (ref 4.31–10.16)

## 2019-04-09 PROCEDURE — 85025 COMPLETE CBC W/AUTO DIFF WBC: CPT

## 2019-04-09 PROCEDURE — 36415 COLL VENOUS BLD VENIPUNCTURE: CPT

## 2019-04-09 PROCEDURE — 80053 COMPREHEN METABOLIC PANEL: CPT

## 2019-04-25 DIAGNOSIS — F32.A DEPRESSION, UNSPECIFIED DEPRESSION TYPE: ICD-10-CM

## 2019-04-25 RX ORDER — ALPRAZOLAM 0.25 MG/1
0.25 TABLET ORAL 2 TIMES DAILY PRN
Qty: 60 TABLET | Refills: 1 | Status: SHIPPED | OUTPATIENT
Start: 2019-04-25 | End: 2019-06-28 | Stop reason: SDUPTHER

## 2019-06-28 ENCOUNTER — OFFICE VISIT (OUTPATIENT)
Dept: FAMILY MEDICINE CLINIC | Facility: CLINIC | Age: 84
End: 2019-06-28
Payer: MEDICARE

## 2019-06-28 VITALS
TEMPERATURE: 97.7 F | DIASTOLIC BLOOD PRESSURE: 80 MMHG | HEIGHT: 55 IN | SYSTOLIC BLOOD PRESSURE: 116 MMHG | BODY MASS INDEX: 26.38 KG/M2 | WEIGHT: 114 LBS

## 2019-06-28 DIAGNOSIS — I10 BENIGN ESSENTIAL HYPERTENSION: ICD-10-CM

## 2019-06-28 DIAGNOSIS — E53.8 VITAMIN B12 DEFICIENCY: ICD-10-CM

## 2019-06-28 DIAGNOSIS — E66.3 OVERWEIGHT (BMI 25.0-29.9): ICD-10-CM

## 2019-06-28 DIAGNOSIS — F32.A DEPRESSION, UNSPECIFIED DEPRESSION TYPE: ICD-10-CM

## 2019-06-28 DIAGNOSIS — I48.0 PAF (PAROXYSMAL ATRIAL FIBRILLATION) (HCC): ICD-10-CM

## 2019-06-28 DIAGNOSIS — F33.3 MAJOR DEPRESSIVE DISORDER, RECURRENT EPISODE, SEVERE, WITH PSYCHOTIC BEHAVIOR (HCC): Primary | ICD-10-CM

## 2019-06-28 PROCEDURE — 99214 OFFICE O/P EST MOD 30 MIN: CPT | Performed by: FAMILY MEDICINE

## 2019-06-28 RX ORDER — ALPRAZOLAM 0.25 MG/1
0.25 TABLET ORAL 3 TIMES DAILY PRN
Qty: 90 TABLET | Refills: 1 | Status: SHIPPED | OUTPATIENT
Start: 2019-06-28

## 2019-06-28 RX ORDER — FLUOXETINE 10 MG/1
10 TABLET, FILM COATED ORAL DAILY
Qty: 30 TABLET | Refills: 0 | Status: SHIPPED | OUTPATIENT
Start: 2019-06-28

## 2019-06-28 RX ORDER — FLUPHENAZINE HYDROCHLORIDE 5 MG/1
5 TABLET ORAL DAILY
Qty: 90 TABLET | Refills: 1 | Status: SHIPPED | OUTPATIENT
Start: 2019-06-28

## 2019-06-28 RX ADMIN — CYANOCOBALAMIN 1000 MCG: 1000 INJECTION INTRAMUSCULAR; SUBCUTANEOUS at 12:09

## 2019-07-15 ENCOUNTER — OFFICE VISIT (OUTPATIENT)
Dept: OBGYN CLINIC | Facility: OTHER | Age: 84
End: 2019-07-15
Payer: MEDICARE

## 2019-07-15 ENCOUNTER — APPOINTMENT (OUTPATIENT)
Dept: RADIOLOGY | Facility: OTHER | Age: 84
End: 2019-07-15
Payer: MEDICARE

## 2019-07-15 VITALS — WEIGHT: 110 LBS | BODY MASS INDEX: 25.46 KG/M2 | HEIGHT: 55 IN

## 2019-07-15 DIAGNOSIS — M79.644 THUMB PAIN, RIGHT: ICD-10-CM

## 2019-07-15 DIAGNOSIS — S62.201A CLOSED FRACTURE OF FIRST METACARPAL BONE OF RIGHT HAND, UNSPECIFIED FRACTURE MORPHOLOGY, UNSPECIFIED PORTION OF METACARPAL, INITIAL ENCOUNTER: Primary | ICD-10-CM

## 2019-07-15 PROCEDURE — 99203 OFFICE O/P NEW LOW 30 MIN: CPT | Performed by: ORTHOPAEDIC SURGERY

## 2019-07-15 PROCEDURE — 73130 X-RAY EXAM OF HAND: CPT

## 2019-07-15 NOTE — PROGRESS NOTES
Chief Complaint   Patient presents with    Right Hand - Pain           Assessment:  Fracture base of right 1st metacarpal    Plan :  I explained to the patient using her son as  that she has a hairline nondisplaced fracture of the base of her right thumb  This normally takes about 6-8 weeks to heal but I will immobilize her only for a total of 3 weeks  I want the splint on full-time except when bathing -this includes sleeping  He can take the splint off and put a small bag of ice or bag of frozen peas on her right  thumb for 10 minutes 2- 3 times a day to decrease pain and swelling  She can take Advil, Aleve, or Tylenol if needed for pain  I will see her back again here in 2 weeks for splint off and repeat x-ray  We will consider further investigation of her numbness at that time  HPI:   This is a 80 y o  [place] female presenting today for orthopedic evaluation regarding her right thumb pain  She is accompanied by her son who is her care-taker and provides translation today  He states that on 7/2/19, she attempted to use the bathroom unassisted when she fell on an outstretched right hand  This is her dominant arm  She was taken to [facility] on 7/4/19 where they took x-rays and splinted the right thumb  She has been wearing this since  She localizes her pain to the right thumb and states that this is "numb"  Her son denies any past significant injury in the past  She has a history of anxiety, depression, and hypertension which contributes to the treatment and prognosis of today's complaint  We will consider further investigation of her numbness at that time      PMHx:         Past Medical History:   Diagnosis Date    Anxiety     Depression     Hypertension     Palpitations     last assessed: 11/04/15    Skin lesion     last assessed: 07/27/16    Stomach ache     Urinary tract infection     as per patients daughter    Wheezing     Chronic       Past Surgical History:   Procedure Laterality Date    FEMUR SURGERY Right     TOTAL HIP ARTHROPLASTY Left 2007    TOTAL HIP ARTHROPLASTY Right        Family History   Problem Relation Age of Onset    Tuberculosis Father        Social History     Socioeconomic History    Marital status:       Spouse name: Not on file    Number of children: Not on file    Years of education: Not on file    Highest education level: Not on file   Occupational History    Not on file   Social Needs    Financial resource strain: Not on file    Food insecurity:     Worry: Not on file     Inability: Not on file    Transportation needs:     Medical: Not on file     Non-medical: Not on file   Tobacco Use    Smoking status: Never Smoker    Smokeless tobacco: Never Used   Substance and Sexual Activity    Alcohol use: No    Drug use: No    Sexual activity: Never   Lifestyle    Physical activity:     Days per week: Not on file     Minutes per session: Not on file    Stress: Not on file   Relationships    Social connections:     Talks on phone: Not on file     Gets together: Not on file     Attends Hoahaoism service: Not on file     Active member of club or organization: Not on file     Attends meetings of clubs or organizations: Not on file     Relationship status: Not on file    Intimate partner violence:     Fear of current or ex partner: Not on file     Emotionally abused: Not on file     Physically abused: Not on file     Forced sexual activity: Not on file   Other Topics Concern    Not on file   Social History Narrative    Always uses seat belt    Daily coffee consumption 1 cup/day    Denied history of exercise frequency    Feels safe at home    Living will in place    No guns in the home       Current Outpatient Medications   Medication Sig Dispense Refill    ALPRAZolam (XANAX) 0 25 mg tablet Take 1 tablet (0 25 mg total) by mouth 3 (three) times a day as needed for anxiety 90 tablet 1    aspirin 81 MG tablet Take 325 mg by mouth daily        cholecalciferol (VITAMIN D3) 1,000 units tablet Take 1,000 Units by mouth daily      FLUoxetine (PROzac) 10 MG tablet Take 1 tablet (10 mg total) by mouth daily 30 tablet 0    fluPHENAZine (PROLIXIN) 5 mg tablet Take 1 tablet (5 mg total) by mouth daily 90 tablet 1    LORazepam (ATIVAN) 0 5 mg tablet Take 0 5 mg by mouth every 6 (six) hours as needed for anxiety      metoprolol succinate (TOPROL-XL) 25 mg 24 hr tablet Take 1 tablet (25 mg total) by mouth daily 90 tablet 1    Multiple Vitamins-Minerals (VITAMENT PO) Take by mouth      Turmeric, Curcuma Longa, (CURCUMIN) POWD by Does not apply route      omeprazole (PriLOSEC) 20 mg delayed release capsule Take 1 capsule (20 mg total) by mouth daily for 30 days 90 capsule 1     Current Facility-Administered Medications   Medication Dose Route Frequency Provider Last Rate Last Dose    cyanocobalamin injection 1,000 mcg  1,000 mcg Intramuscular Q30 Days Ary Cervantes MD   1,000 mcg at 06/28/19 1209       Allergies: Patient has no known allergies  ROS:  Positive for GERD, irregular heartbeat, history of phlebitis, radiculopathy in neck and back, recurrent bladder infections, palpitations, anxiety/depression, poor appetite, vertigo, and orthopedic complaints as above among others  The remaining 2/12 systems on the intake sheet that I reviewed were negative  PE:  Ht 4' 6" (1 372 m)   Wt 49 9 kg (110 lb)   BMI 26 52 kg/m²   Constitutional: The patient was oriented to person, place, and time  Thin, elderly  In no acute distress  HEENT: Vision intact  Hearing normal  Swallowing normal   Head: Normocephalic  Cardiovascular: Intact distal pulses  Pulse regular  Pulmonary/Chest: Effort normal  No respiratory distress  Neurological: Relatively alert and oriented to person, place, and time  Skin: Skin is warm  Psychiatric: Normal mood and affect  Ortho Exam:  She was elderly, thin, in mild distress  She did not speak [place]      She had mild swelling over the right 1st metacarpal along the entire shaft  She had classic subluxation   of her CMC joints bilaterally and was tender diffusely, not over 1 particular area proximally or distally over the right 1st metacarpal shaft  She had normal finger motion and sensation to light touch was intact, although she complained of numbness and warmth  according to her son  Radial pulse was normal   She had good elbow flexion and extension  She had no cellulitis up her  arm and had no antecubital adenopathy  Studies reviewed:  I personally reviewed all the x-rays, and new specific hand x-rays on 07/15/2019    These new x-rays showed a hairline nondisplaced fracture through the base of the 1st metacarpal   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

## 2019-07-15 NOTE — LETTER
July 15, 2019     João Schreiber, [id number] [id number]    Patient: Shena Oneal   YOB: 1929   Date of Visit: 7/15/2019       Dear Dr Jackeline Sanches: Thank you for referring Deepika Oneal to me for evaluation  Below are my notes for this consultation  If you have questions, please do not hesitate to call me  I look forward to following your patient along with you  Sincerely,        Severo Leos MD        CC: No Recipients  Severo Leos MD  7/15/2019  1:49 PM  Sign at close encounter  Chief Complaint   Patient presents with    Right Hand - Pain           Assessment:  Fracture base of right 1st metacarpal    Plan :  I explained to the patient using her son as  that she has a hairline nondisplaced fracture of the base of her right thumb  This normally takes about 6-8 weeks to heal but I will immobilize her only for a total of 3 weeks  I want the splint on full-time except when bathing -this includes sleeping  He can take the splint off and put a small bag of ice or bag of frozen peas on her right  thumb for 10 minutes 2- 3 times a day to decrease pain and swelling  She can take Advil, Aleve, or Tylenol if needed for pain  I will see her back again here in 2 weeks for splint off and repeat x-ray  We will consider further investigation of her numbness at that time  HPI:   This is a 80 y o  [facility] female presenting today for orthopedic evaluation regarding her right thumb pain  She is accompanied by her son who is her care-taker and provides translation today  He states that on 7/2/19, she attempted to use the bathroom unassisted when she fell on an outstretched right hand  This is her dominant arm  She was taken to [facility] on 7/4/19 where they took x-rays and splinted the right thumb  She has been wearing this since  She localizes her pain to the right thumb and states that this is "numb"    Her son denies any past significant injury in the past  She has a history of anxiety, depression, and hypertension which contributes to the treatment and prognosis of today's complaint  We will consider further investigation of her numbness at that time  PMHx:         Past Medical History:   Diagnosis Date    Anxiety     Depression     Hypertension     Palpitations     last assessed: 11/04/15    Skin lesion     last assessed: 07/27/16    Stomach ache     Urinary tract infection     as per patients daughter    Wheezing     Chronic       Past Surgical History:   Procedure Laterality Date    FEMUR SURGERY Right     TOTAL HIP ARTHROPLASTY Left 2007    TOTAL HIP ARTHROPLASTY Right        Family History   Problem Relation Age of Onset    Tuberculosis Father        Social History     Socioeconomic History    Marital status:       Spouse name: Not on file    Number of children: Not on file    Years of education: Not on file    Highest education level: Not on file   Occupational History    Not on file   Social Needs    Financial resource strain: Not on file    Food insecurity:     Worry: Not on file     Inability: Not on file    Transportation needs:     Medical: Not on file     Non-medical: Not on file   Tobacco Use    Smoking status: Never Smoker    Smokeless tobacco: Never Used   Substance and Sexual Activity    Alcohol use: No    Drug use: No    Sexual activity: Never   Lifestyle    Physical activity:     Days per week: Not on file     Minutes per session: Not on file    Stress: Not on file   Relationships    Social connections:     Talks on phone: Not on file     Gets together: Not on file     Attends Orthodoxy service: Not on file     Active member of club or organization: Not on file     Attends meetings of clubs or organizations: Not on file     Relationship status: Not on file    Intimate partner violence:     Fear of current or ex partner: Not on file     Emotionally abused: Not on file     Physically abused: Not on file     Forced sexual activity: Not on file   Other Topics Concern    Not on file   Social History Narrative    Always uses seat belt    Daily coffee consumption 1 cup/day    Denied history of exercise frequency    Feels safe at home    Living will in place    No guns in the home       Current Outpatient Medications   Medication Sig Dispense Refill    ALPRAZolam (XANAX) 0 25 mg tablet Take 1 tablet (0 25 mg total) by mouth 3 (three) times a day as needed for anxiety 90 tablet 1    aspirin 81 MG tablet Take 325 mg by mouth daily        cholecalciferol (VITAMIN D3) 1,000 units tablet Take 1,000 Units by mouth daily      FLUoxetine (PROzac) 10 MG tablet Take 1 tablet (10 mg total) by mouth daily 30 tablet 0    fluPHENAZine (PROLIXIN) 5 mg tablet Take 1 tablet (5 mg total) by mouth daily 90 tablet 1    LORazepam (ATIVAN) 0 5 mg tablet Take 0 5 mg by mouth every 6 (six) hours as needed for anxiety      metoprolol succinate (TOPROL-XL) 25 mg 24 hr tablet Take 1 tablet (25 mg total) by mouth daily 90 tablet 1    Multiple Vitamins-Minerals (VITAMENT PO) Take by mouth      Turmeric, Curcuma Longa, (CURCUMIN) POWD by Does not apply route      omeprazole (PriLOSEC) 20 mg delayed release capsule Take 1 capsule (20 mg total) by mouth daily for 30 days 90 capsule 1     Current Facility-Administered Medications   Medication Dose Route Frequency Provider Last Rate Last Dose    cyanocobalamin injection 1,000 mcg  1,000 mcg Intramuscular Q30 Days James Merlos MD   1,000 mcg at 06/28/19 1209       Allergies: Patient has no known allergies  ROS:  Positive for GERD, irregular heartbeat, history of phlebitis, radiculopathy in neck and back, recurrent bladder infections, palpitations, anxiety/depression, poor appetite, vertigo, and orthopedic complaints as above among others  The remaining 2/12 systems on the intake sheet that I reviewed were negative      PE:  Ht 4' 6" (1 372 m)   Wt 49 9 kg (110 lb)   BMI 26 52 kg/m²    Constitutional: The patient was oriented to person, place, and time  Thin, elderly  In no acute distress  HEENT: Vision intact  Hearing normal  Swallowing normal   Head: Normocephalic  Cardiovascular: Intact distal pulses  Pulse regular  Pulmonary/Chest: Effort normal  No respiratory distress  Neurological: Relatively alert and oriented to person, place, and time  Skin: Skin is warm  Psychiatric: Normal mood and affect  Ortho Exam:  She was elderly, thin, in mild distress  She did not speak [place]  She had mild swelling over the right 1st metacarpal along the entire shaft  She had classic subluxation   of her CMC joints bilaterally and was tender diffusely, not over 1 particular area proximally or distally over the right 1st metacarpal shaft  She had normal finger motion and sensation to light touch was intact, although she complained of numbness and warmth  according to her son  Radial pulse was normal   She had good elbow flexion and extension  She had no cellulitis up her  arm and had no antecubital adenopathy  Studies reviewed:  I personally reviewed all the x-rays, and new specific hand x-rays on 07/15/2019    These new x-rays showed a hairline nondisplaced fracture through the base of the 1st metacarpal   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

## 2019-07-15 NOTE — PATIENT INSTRUCTIONS
Plan :  I explained to the patient using her son as  that she has a hairline nondisplaced fracture of the base of her right thumb  This normally takes about 6-8 weeks to heal but I will immobilize her only for a total of 3 weeks  I want the splint on full-time except when bathing -this includes sleeping  He can take the splint off and put a small bag of ice or bag of frozen peas on her right  thumb for 10 minutes 2- 3 times a day to decrease pain and swelling  She can take Advil, Aleve, or Tylenol if needed for pain  I will see her back again here in 2 weeks for splint off and repeat x-ray  We will consider further investigation of her numbness at that time

## 2019-08-05 ENCOUNTER — APPOINTMENT (OUTPATIENT)
Dept: RADIOLOGY | Facility: OTHER | Age: 84
End: 2019-08-05
Payer: MEDICARE

## 2019-08-05 ENCOUNTER — OFFICE VISIT (OUTPATIENT)
Dept: OBGYN CLINIC | Facility: OTHER | Age: 84
End: 2019-08-05
Payer: MEDICARE

## 2019-08-05 VITALS — HEIGHT: 55 IN | WEIGHT: 110 LBS | BODY MASS INDEX: 25.46 KG/M2

## 2019-08-05 DIAGNOSIS — S62.201D CLOSED FRACTURE OF FIRST METACARPAL BONE OF RIGHT HAND WITH ROUTINE HEALING, UNSPECIFIED FRACTURE MORPHOLOGY, UNSPECIFIED PORTION OF METACARPAL, SUBSEQUENT ENCOUNTER: ICD-10-CM

## 2019-08-05 DIAGNOSIS — S62.201D CLOSED FRACTURE OF FIRST METACARPAL BONE OF RIGHT HAND WITH ROUTINE HEALING, UNSPECIFIED FRACTURE MORPHOLOGY, UNSPECIFIED PORTION OF METACARPAL, SUBSEQUENT ENCOUNTER: Primary | ICD-10-CM

## 2019-08-05 PROCEDURE — 73130 X-RAY EXAM OF HAND: CPT

## 2019-08-05 PROCEDURE — 99213 OFFICE O/P EST LOW 20 MIN: CPT | Performed by: ORTHOPAEDIC SURGERY

## 2019-08-05 NOTE — PROGRESS NOTES
Chief Complaint   Patient presents with    Right Hand - Follow-up           Assessment:  Fracture base of right 1st metacarpal    Plan :  I explained to the patient's son, who interpreted for his mother in [facility], that the  fracture is healing  The fracture line is still faintly visible and this will continue to heal over the next month  She is nontender over this area and therefore I stopped all immobilization  I started her on active range of motion in water for 5 minutes twice a day for the next 1-2 weeks  I want her to dry the skin thoroughly and then rub skin cream in the area  I gave her putty to use on dry land to stimulate finger motion  I encouraged her to start using her hand for normal activities of daily living but avoid heavy lifting for the 1st few weeks  She may increase her activities as her pain permits I sent her back to her family doctor for routine care and would be happy to see her back again if she has any further issues      HPI:   This is a 80 y o  [facility] female presenting today for orthopedic evaluation regarding her right thumb pain  She is accompanied by her son who is her care-taker and provides translation today  He states that on 7/2/19, she attempted to use the bathroom unassisted when she fell on an outstretched right hand  This is her dominant arm  She was taken to [facility] on 7/4/19 where they took x-rays and splinted the right thumb  She has been wearing this since  She localizes her pain to the right thumb and states that this is "numb"  Her son denies any past significant injury in the past  She has a history of anxiety, depression, and hypertension which contributes to the treatment and prognosis of today's complaint  We will consider further investigation of her numbness at that time  Patient returns today on 8/5/2019 for follow-up evaluation of right 1st metacarpal base fracture  She is now 5 weeks post injury    She is seen today with the assistance of her son who is providing translation  She reports that she has been consistent with use of the brace, except for hygiene purposes  She denies any pain at time of visit  She is not currently taking any medications for pain  She denies any recent bruising, swelling, numbness, or tingling  She is very anxious to begin using her right hand, as her left hand is incapacitated, and she is very reliant on her right upper extremity to aid herself  ROS:  Positive for GERD, irregular heartbeat, history of phlebitis, radiculopathy in neck and back, recurrent bladder infections, palpitations, anxiety/depression, poor appetite, vertigo, and orthopedic complaints as above among others  The remaining 2/12 systems on the intake sheet that I reviewed were negative  PE:  Ht 4' 6" (1 372 m)   Wt 49 9 kg (110 lb)   BMI 26 52 kg/m²   She was in no acute distress  Exam today of her right hand, she showed  mild skin irritations consistent with contact points from the thumb spica splint  Otherwise, skin is warm and dry to touch with no signs of erythema, ecchymosis, or infection  Patient is nontender to palpation over the 1st metacarpal shaft or base, or over the 1st Aia 16 joint  Patient has multiple arthritic joints of both the right and left hands including the CMC joints, MCP joints, and interphalangeal joints  She has surprisingly good IP joint motion with some limitation of MCP joint motion  There was no angular or rotary deformity of that thumb  Sensation and capillary refill were normal in her thumb    Studies reviewed:  I personally reviewed all the x-rays, and new specific hand x-rays on 07/15/2019  These new x-rays showed a hairline nondisplaced fracture through the base of the 1st metacarpal   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

## 2019-08-05 NOTE — PATIENT INSTRUCTIONS
Plan :  I explained to the patient's son, who interpreted for his mother in [facility], that the  fracture is healing  The fracture line is still faintly visible and this will continue to heal over the next month  She is nontender over this area and therefore I stopped all immobilization  I started her on active range of motion in water for 5 minutes twice a day for the next 1-2 weeks  I want her to dry the skin thoroughly and then rub skin cream in the area  I gave her putty to use on dry land to stimulate finger motion  I encouraged her to start using her hand for normal activities of daily living but avoid heavy lifting for the 1st few weeks     She may increase her activities as her pain permits I sent her back to her family doctor for routine care and would be happy to see her back again if she has any further issues

## 2019-08-06 ENCOUNTER — TELEPHONE (OUTPATIENT)
Dept: FAMILY MEDICINE CLINIC | Facility: CLINIC | Age: 84
End: 2019-08-06

## 2019-08-06 NOTE — TELEPHONE ENCOUNTER
Lab called with a critical result on patient from 8/5/2019  Patient's Troponin level came back at 1 51   Please advise

## 2019-08-06 NOTE — TELEPHONE ENCOUNTER
Please call patient's family  I do not know who ordered the troponin and will eye    However if it is elevated like this she could be having an MI so I recommend that she follow up immediately in the emergency room or call her cardiologist

## 2019-09-16 NOTE — DISCHARGE INSTRUCTIONS
Weakness   WHAT YOU NEED TO KNOW:   Weakness is a loss of muscle strength  It may be caused by brain, nerve, or muscle problems  Physical and mental conditions such as heart problems, pregnancy, dehydration, or depression may also cause weakness  Reactions to certain drugs can cause weakness  Parts of your body may become weak if you need to wear a cast or splint or have been on bed rest for a long time  DISCHARGE INSTRUCTIONS:   Call 911 for any of the following:   · You have any of the following signs of a stroke:      ¨ Numbness or drooping on one side of your face     ¨ Weakness in an arm or leg    ¨ Confusion or difficulty speaking    ¨ Dizziness, a severe headache, or vision loss    · You lose feeling in your weakened body area  · You have electric shock-like feelings down your arms and legs when you flex or move your neck  · You have sudden or increased trouble speaking, swallowing, or breathing  Return to the emergency department if:   · You have severe pain in your back, arms, or legs that worsens  · You have sudden or worsened muscle weakness or loss of movement  · You are not able to control when you urinate or have a bowel movement  Contact your healthcare provider if:   · You feel depressed or anxious  · You have questions or concerns about your condition or care  Manage weakness:   · Use assistive devices as directed  These help protect you from injury  Examples include a walker or cane  Have someone install handrails in your home  These will help you get out of a bathtub or stand up from a toilet  Use a shower chair so you can sit while you shower  Sit down on the toilet or another chair to dry off and put on your clothes  Get help going up and down stairs if your legs are weak  · Go to physical or occupational therapy if directed  A physical therapist can teach you exercises to help strengthen weak muscles   An occupational therapist can show you ways to do your daily [facility]... activities more easily  For example, light forks and spoons can be easier to use if you have hand weakness  You may also learn ways to organize your household items so you are not moving heavy items  · Balance rest with exercise  Exercise can help increase your muscle strength and energy  Do not exercise for long periods at a time  Take breaks often to rest  Too much exercise can cause muscle strain or make you more tired  Ask your healthcare provider how much exercise is right for you  · Eat a variety of healthy foods  Too much or too little food may cause weakness or tiredness  Ask your healthcare provider what a healthy amount of food is for you  Healthy foods include fruits, vegetables, whole-grain breads, low-fat dairy products, lean meats and fish, nuts, and cooked beans  · Do not smoke  Nicotine and other chemicals in cigarettes and cigars can make your symptoms worse, and can cause lung damage  Ask your healthcare provider for information if you currently smoke and need help to quit  E-cigarettes or smokeless tobacco still contain nicotine  Talk to your healthcare provider before you use these products  · Do not use caffeine, alcohol, or illegal drugs  These may cause muscle twitching, which could lead to worsened weakness  Follow up with your healthcare provider as directed:  Write down your questions so you remember to ask them during your visits  © 2017 [address] Information is for End User's use only and may not be sold, redistributed or otherwise used for commercial purposes  All illustrations and images included in CareNotes® are the copyrighted property of [employer]  or [employer]  The above information is an  only  It is not intended as medical advice for individual conditions or treatments  Talk to your doctor, nurse or pharmacist before following any medical regimen to see if it is safe and effective for you        Fall Prevention WHAT YOU NEED TO KNOW:   Fall prevention includes ways to make your home and other areas safer  It also includes ways you can move more carefully to prevent a fall  Health conditions that cause changes in your blood pressure, vision, or muscle strength and coordination may increase your risk for falls  Medicines may also increase your risk for falls if they make you dizzy, weak, or sleepy  DISCHARGE INSTRUCTIONS:   Call 911 or have someone else call if:   · You have fallen and are unconscious  · You have fallen and cannot move part of your body  Contact your healthcare provider if:   · You have fallen and have pain or a headache  · You have questions or concerns about your condition or care  Fall prevention tips:   · Stand or sit up slowly  This may help you keep your balance and prevent falls  · Use assistive devices as directed  Your healthcare provider may suggest that you use a cane or walker to help you keep your balance  You may need to have grab bars put in your bathroom near the toilet or in the shower  · Wear shoes that fit well and have soles that   Wear shoes both inside and outside  Use slippers with good   Do not wear shoes with high heels  · Wear a personal alarm  This is a device that allows you to call 911 if you fall and need help  Ask your healthcare provider for more information  · Stay active  Exercise can help strengthen your muscles and improve your balance  Your healthcare provider may recommend water aerobics or walking  He or she may also recommend physical therapy to improve your coordination  Never start an exercise program without talking to your healthcare provider first      · Manage your medical conditions  Keep all appointments with your healthcare providers  Visit your eye doctor as directed  Home safety tips:   · Add items to prevent falls in the bathroom  Put nonslip strips on your bath or shower floor to prevent you from slipping   Use a bath mat if you do not have carpet in the bathroom  This will prevent you from falling when you step out of the bath or shower  Use a shower seat so you do not need to stand while you shower  Sit on the toilet or a chair in your bathroom to dry yourself and put on clothing  This will prevent you from losing your balance from drying or dressing yourself while you are standing  · Keep paths clear  Remove books, shoes, and other objects from walkways and stairs  Place cords for telephones and lamps out of the way so that you do not need to walk over them  Tape them down if you cannot move them  Remove small rugs  If you cannot remove a rug, secure it with double-sided tape  This will prevent you from tripping  · Install bright lights in your home  Use night lights to help light paths to the bathroom or kitchen  Always turn on the light before you start walking  · Keep items you use often on shelves within reach  Do not use a step stool to help you reach an item  · Paint or place reflective tape on the edges of your stairs  This will help you see the stairs better  Follow up with your healthcare provider as directed:  Write down your questions so you remember to ask them during your visits  © 2017 [address] Information is for End User's use only and may not be sold, redistributed or otherwise used for commercial purposes  All illustrations and images included in CareNotes® are the copyrighted property of [employer]  or [employer]av[employer] Blackburn  The above information is an  only  It is not intended as medical advice for individual conditions or treatments  Talk to your doctor, nurse or pharmacist before following any medical regimen to see if it is safe and effective for you